# Patient Record
Sex: FEMALE | Race: WHITE | NOT HISPANIC OR LATINO | Employment: FULL TIME | URBAN - NONMETROPOLITAN AREA
[De-identification: names, ages, dates, MRNs, and addresses within clinical notes are randomized per-mention and may not be internally consistent; named-entity substitution may affect disease eponyms.]

---

## 2018-03-23 ENCOUNTER — TRANSCRIBE ORDERS (OUTPATIENT)
Dept: ADMINISTRATIVE | Facility: HOSPITAL | Age: 43
End: 2018-03-23

## 2018-03-23 DIAGNOSIS — Z12.39 SCREENING BREAST EXAMINATION: Primary | ICD-10-CM

## 2018-05-07 ENCOUNTER — APPOINTMENT (OUTPATIENT)
Dept: MAMMOGRAPHY | Facility: HOSPITAL | Age: 43
End: 2018-05-07

## 2018-06-06 ENCOUNTER — APPOINTMENT (OUTPATIENT)
Dept: MAMMOGRAPHY | Facility: HOSPITAL | Age: 43
End: 2018-06-06

## 2018-07-23 ENCOUNTER — HOSPITAL ENCOUNTER (OUTPATIENT)
Dept: MAMMOGRAPHY | Facility: HOSPITAL | Age: 43
Discharge: HOME | End: 2018-07-23
Admitting: OBSTETRICS & GYNECOLOGY

## 2018-07-23 DIAGNOSIS — Z12.39 SCREENING BREAST EXAMINATION: ICD-10-CM

## 2018-07-23 PROCEDURE — 77067 SCR MAMMO BI INCL CAD: CPT

## 2018-07-23 PROCEDURE — 77063 BREAST TOMOSYNTHESIS BI: CPT

## 2019-07-29 ENCOUNTER — TRANSCRIBE ORDERS (OUTPATIENT)
Dept: ADMINISTRATIVE | Facility: HOSPITAL | Age: 44
End: 2019-07-29

## 2019-07-29 DIAGNOSIS — Z12.31 ENCOUNTER FOR SCREENING MAMMOGRAM FOR MALIGNANT NEOPLASM OF BREAST: Primary | ICD-10-CM

## 2019-09-24 ENCOUNTER — APPOINTMENT (OUTPATIENT)
Dept: MAMMOGRAPHY | Facility: HOSPITAL | Age: 44
End: 2019-09-24

## 2019-09-30 ENCOUNTER — HOSPITAL ENCOUNTER (OUTPATIENT)
Dept: MAMMOGRAPHY | Facility: HOSPITAL | Age: 44
Discharge: HOME | End: 2019-09-30
Admitting: OBSTETRICS & GYNECOLOGY

## 2019-09-30 DIAGNOSIS — Z12.31 ENCOUNTER FOR SCREENING MAMMOGRAM FOR MALIGNANT NEOPLASM OF BREAST: ICD-10-CM

## 2019-09-30 PROCEDURE — 77067 SCR MAMMO BI INCL CAD: CPT

## 2019-09-30 PROCEDURE — 77063 BREAST TOMOSYNTHESIS BI: CPT

## 2020-10-16 ENCOUNTER — TRANSCRIBE ORDERS (OUTPATIENT)
Dept: ADMINISTRATIVE | Facility: HOSPITAL | Age: 45
End: 2020-10-16

## 2020-10-16 DIAGNOSIS — Z12.31 VISIT FOR SCREENING MAMMOGRAM: Primary | ICD-10-CM

## 2020-12-08 ENCOUNTER — HOSPITAL ENCOUNTER (OUTPATIENT)
Dept: MAMMOGRAPHY | Facility: HOSPITAL | Age: 45
Discharge: HOME | End: 2020-12-08
Admitting: OBSTETRICS & GYNECOLOGY

## 2020-12-08 DIAGNOSIS — Z12.31 VISIT FOR SCREENING MAMMOGRAM: ICD-10-CM

## 2020-12-08 PROCEDURE — 77067 SCR MAMMO BI INCL CAD: CPT

## 2020-12-08 PROCEDURE — 77063 BREAST TOMOSYNTHESIS BI: CPT

## 2021-06-07 RX ORDER — METOPROLOL SUCCINATE 50 MG/1
50 TABLET, EXTENDED RELEASE ORAL DAILY
COMMUNITY

## 2021-06-07 RX ORDER — CITALOPRAM 20 MG/1
20 TABLET ORAL DAILY
COMMUNITY

## 2021-07-08 ENCOUNTER — OFFICE VISIT (OUTPATIENT)
Dept: BARIATRICS/WEIGHT MGMT | Facility: CLINIC | Age: 46
End: 2021-07-08

## 2021-07-08 ENCOUNTER — DOCUMENTATION (OUTPATIENT)
Dept: BARIATRICS/WEIGHT MGMT | Facility: CLINIC | Age: 46
End: 2021-07-08

## 2021-07-08 VITALS
HEART RATE: 85 BPM | RESPIRATION RATE: 18 BRPM | BODY MASS INDEX: 42.65 KG/M2 | WEIGHT: 256 LBS | SYSTOLIC BLOOD PRESSURE: 122 MMHG | HEIGHT: 65 IN | OXYGEN SATURATION: 98 % | DIASTOLIC BLOOD PRESSURE: 66 MMHG | TEMPERATURE: 97.5 F

## 2021-07-08 DIAGNOSIS — R06.2 WHEEZING: ICD-10-CM

## 2021-07-08 DIAGNOSIS — Z90.49 S/P CHOLE: ICD-10-CM

## 2021-07-08 DIAGNOSIS — Z86.16 HISTORY OF COVID-19: ICD-10-CM

## 2021-07-08 DIAGNOSIS — F41.9 ANXIETY: ICD-10-CM

## 2021-07-08 DIAGNOSIS — R53.83 FATIGUE, UNSPECIFIED TYPE: ICD-10-CM

## 2021-07-08 DIAGNOSIS — E66.01 MORBID OBESITY WITH BMI OF 40.0-44.9, ADULT (HCC): ICD-10-CM

## 2021-07-08 DIAGNOSIS — R12 HEARTBURN: ICD-10-CM

## 2021-07-08 DIAGNOSIS — I10 HYPERTENSION, UNSPECIFIED TYPE: Primary | ICD-10-CM

## 2021-07-08 PROCEDURE — 99204 OFFICE O/P NEW MOD 45 MIN: CPT | Performed by: PHYSICIAN ASSISTANT

## 2021-07-08 RX ORDER — MONTELUKAST SODIUM 10 MG/1
10 TABLET ORAL NIGHTLY
COMMUNITY

## 2021-07-08 NOTE — PROGRESS NOTES
[facility] BARIATRIC SURGERY  [address]  [address]  [facility] [address] [phone]      Patient  Name:  Omero Cadet  :  1975        Chief Complaint:  weight gain; unable to maintain weight loss    History of Present Illness:  Omero Cadet is a 45 y.o. female who presents today for evaluation, education and consultation regarding bariatric and metabolic surgery. The patient is interested in sleeve gastrectomy with NERI Vines.     Omero has been overweight for at least 20+ years, has been 35 pounds or more overweight for at least 20+ years, has been 100 pounds or more overweight for 20+ or more years and started dieting at age 38.      Previous diet attempts include: None; ageless; Ionamin/Adipex.  The most weight Omero lost was 40 pounds on ageless but was only able to maintain that weight loss for several months.  Her maximum lifetime weight is 256 pounds.    As above, patient has been overweight for many years, with numerous failed dietary/weight loss attempts.  She now has obesity related comorbidities and as such has decided to pursue weight loss surgery.    Sister had LSG with Dr. Dueñas. She is pursuing LSG to improve overall health and quality of life.     H/o HTN, anxiety.  Had covid with residual wheezing, now on Singulair. Was never hospitalized. No imaging/ eval.     GI: chronic heartburn treated with tums prn, EGD in remote past, denies prior h pylori. S/p etelvina with gallstones.     All other past medical, surgical, social and family history have been obtained and discussed as pertinent to bariatric surgery as below.     Past Medical History:   Diagnosis Date   • Anxiety    • Fatigue    • Heartburn     tums prn-  EGD remote past, no prior h pylori   • History of COVID-19     not hospitalized, now wtih wheezing and on singulair   • HTN (hypertension)    • Morbid obesity with BMI of 40.0-44.9, adult (CMS/[facility])    • S/P etelvina     wtih  gallstones     Past Surgical History:   Procedure Laterality Date   • HYSTERECTOMY  2015    partial, laparoscopic   • LAPAROSCOPIC CHOLECYSTECTOMY  2005    with stones       Allergies   Allergen Reactions   • Penicillins Rash     Tolerates cephalosporins       Current Outpatient Medications:   •  citalopram (CeleXA) 20 MG tablet, Take 20 mg by mouth Daily., Disp: , Rfl:   •  metoprolol succinate XL (TOPROL-XL) 50 MG 24 hr tablet, Take 50 mg by mouth Daily., Disp: , Rfl:   •  montelukast (SINGULAIR) 10 MG tablet, Take 10 mg by mouth Every Night., Disp: , Rfl:     Social History     Socioeconomic History   • Marital status:      Spouse name: Not on file   • Number of children: Not on file   • Years of education: Not on file   • Highest education level: Not on file   Tobacco Use   • Smoking status: Former Smoker     Packs/day: 1.00     Years: 10.00     Pack years: 10.00     Types: Cigarettes   • Smokeless tobacco: Never Used   Vaping Use   • Vaping Use: Never used   Substance and Sexual Activity   • Alcohol use: Never   • Drug use: Never     Family History   Problem Relation Age of Onset   • Obesity Father    • Hypertension Father    • Cancer Father 65        Melanoma   • Breast cancer Neg Hx        Review of Systems:  Constitutional:  Reports fatigue, weight gain and denies fevers, chills.  HEENT:  denies headache, ear pain or loss of hearing, blurred or double vision, nasal discharge or sore throat.  Cardiovascular:  Reports HTN and denies HLD, CAD, Atrial Fib, hx heart disease, heart murmur, hx MI, chest pain, palpitations, edema, hx DVT.  Respiratory:  Reports wheezing and denies dyspnea on exertion, shortness of breath , cough , sleep apnea, asthma, hx PE.  Gastrointestinal:  Reports heartburn, gallbladder issues and denies dysphagia, nausea, vomiting, abdominal pain, IBS, diarrhea, constipation, melena, blood in stool, liver disease, hx pancreatitis.  Genitourinary:  Reports none and denies history of   frequent UTI, incontinence, hematuria, dysuria, polyuria, polydipsia, renal insufficiency, renal failure.    Musculoskeletal:  Reports none and denies joint pain, fibromyalgia, arthritis and autoimmune disease.  Neurological:  Reports none and denies headaches, migraines, numbness /tingling, dizziness, confusion, seizure, stroke.  Psychiatric:  Reports hx anxiety and denies depressed mood, hx depression, bipolar disorder, suicidal ideation, hx suicide attempt, hx self injury, hx substance abuse, eating disorder.  Endocrine:  Reports none and denies glucose intolerance, diabetes, thyroid disease, gout.  Hematologic:  Reports none and denies bruising, bleeding disorder, hx anemia, hx blood transfusion.  Skin:  Reports none and denies rashes, hx MRSA.      Physical Exam:  Vital Signs:  Weight: 116 kg (256 lb)   Body mass index is 42.6 kg/m².  Temp: 97.5 °F (36.4 °C)   Heart Rate: 85   BP: 122/66     Physical Exam  Vitals reviewed.   Constitutional:       Appearance: She is well-developed. She is obese.   HENT:      Head: Normocephalic.      Comments: mask  Neck:      Thyroid: No thyromegaly.   Cardiovascular:      Rate and Rhythm: Normal rate and regular rhythm.      Heart sounds: Normal heart sounds.   Pulmonary:      Effort: Pulmonary effort is normal. No respiratory distress.      Breath sounds: Normal breath sounds. No wheezing.   Abdominal:      General: Bowel sounds are normal. There is no distension.      Palpations: Abdomen is soft.      Tenderness: There is no abdominal tenderness.      Comments: Lap scars   Musculoskeletal:         General: Normal range of motion.      Cervical back: Normal range of motion and neck supple.   Skin:     General: Skin is warm and dry.   Neurological:      Mental Status: She is alert and oriented to person, place, and time.   Psychiatric:         Mood and Affect: Mood normal.         Behavior: Behavior normal.         Thought Content: Thought content normal.         Judgment:  Judgment normal.         Patient Active Problem List   Diagnosis   • HTN (hypertension)   • Anxiety   • S/P etelvina   • Heartburn   • History of COVID-19   • Morbid obesity with BMI of 40.0-44.9, adult (CMS/[facility])   • Fatigue       Assessment:    Omero Cadet is a 45 y.o. year old female with medically complicated obesity pursuing sleeve gastrectomy.    Weight loss surgery is deemed medically necessary given the following obesity related comorbidities including hypertension and GERD with current Weight: 116 kg (256 lb) and Body mass index is 42.6 kg/m²..    Plan:  The consultation plan and program requirements were reviewed with the patient.  The patient has been advised that a letter of medical support must be obtained from her primary care physician or referring provider. A psychological evaluation will be arranged.  A nutritional evaluation will be performed.  The patient was advised to start a high protein and low carbohydrate diet.  Necessary lifestyle modifications were discussed.  Instructions on how to access HARMONY was given to the patient.  HARMONY is an internet based educational video that explains the surgical procedure chosen and answers basic questions regarding that procedure.     Patient's Body mass index is 42.6 kg/m². indicating that she is morbidly obese (BMI > 40 or > 35 with obesity - related health condition). Obesity-related health conditions include the following: as above. Obesity is worsening. BMI is is above average; BMI management plan is completed. We discussed consulting a Bariatric surgeon..        Preoperative testing will include: CBC, CMP, Lipids, TSH, HgA1C, H.Pylori UBT, EKG, CXR, Pulmonary Function Testing and EGD     The risks and benefits of the upper endoscopy were discussed with the patient in detail and all questions were answered.  Possibility of perforation, bleeding, aspiration, and anesthesia reaction were reviewed.  Patient agrees to proceed.      Additional preop  clearances required prior to surgery: Cardiology.      The patient has been educated on expected postoperative lifestyle changes, including commitment to high protein diet, vitamin regimen, and exercise program.  They are aware that support groups are encouraged for optimal weight loss results. Patient understands that bariatric surgery is not cosmetic surgery but rather a tool to help make a lifelong commitment to lifestyle changes including diet, exercise, behavior modifications, and healthy habits. The procedure was discussed with the patient and all questions were answered. The importance of avoiding ASA/ NSAIDS/ steroids/ tobacco/ hormones/ immunomodulators perioperatively was discussed.         Sarahi Street PA-C

## 2021-07-08 NOTE — PROGRESS NOTES
"Weight Loss Surgery  Presurgical Nutrition Assessment     Omero Cadet  07/08/2021  [id number]  [id number]  1975  female    Surgery desired: Sleeve Gastrectomy    Ht 165.1 cm (65\"); Wt 116 kg  (256 #); BMI 42.6  Past Medical History:   Diagnosis Date   • Anxiety    • Fatigue    • Heartburn     tums prn-  EGD remote past, no prior h pylori   • History of COVID-19     not hospitalized, now wtih wheezing and on singulair   • HTN (hypertension)    • Morbid obesity with BMI of 40.0-44.9, adult (CMS/[facility])    • S/P etelvina     wtih gallstones     Past Surgical History:   Procedure Laterality Date   • HYSTERECTOMY  2015    partial, laparoscopic   • LAPAROSCOPIC CHOLECYSTECTOMY  2005    with stones     Allergies   Allergen Reactions   • Penicillins Rash     Tolerates cephalosporins       Current Outpatient Medications:   •  citalopram (CeleXA) 20 MG tablet, Take 20 mg by mouth Daily., Disp: , Rfl:   •  metoprolol succinate XL (TOPROL-XL) 50 MG 24 hr tablet, Take 50 mg by mouth Daily., Disp: , Rfl:   •  montelukast (SINGULAIR) 10 MG tablet, Take 10 mg by mouth Every Night., Disp: , Rfl:       Nutrition Assessment    Estimated energy needs:  1815 kcal    Estimated calories for weight loss:  1500 kcal    IBW (Pounds):  150 #        Excess body weight (Pounds):  105 #       Nutrition Recall  24 Hour recall: (B) (L) (D) -  Reviewed and discussed with patient. Pt eats at her desk at work @ 9:30: 1 pkt instant oatmeal + 2 sl toast & bottled water.  Lunch @ 12:30 p = 1 c chicken noodle soup /c 8 oz reg Mt Dew OR orders out for CB & fries /c others in office, /c Mt Dew.  Snack @ 4:15 = snack sized Cheezits.  Dinner @ 6 pm = 4 chicken strips + 1 piece of toast.  Diet is low in protein & lacks fruits & veggies. Pt to focus on ingesting adeq protein in 3 reg balanced meals + 2-3 high prot snks qd.       Exercise  never      Education    Provided information packet re:  Sleeve Gastrectomy  1. Reviewed guidelines for higher " protein, limited carbohydrate diet to promote weight loss.  Encouraged patient to incorporate these principles of healthy eating from now until approximately 2 weeks prior to bariatric surgery date, when an even lower carbohydrate “liver-shrinking” regimen will be followed. (Information sheet re pre-op diet given).  Explained that after recovery from surgery this diet will again be followed to ensure further loss and for weight maintenance.    2. Encouraged patient to choose an acceptable protein supplement powder or shake for post-surgery liquid diet.  Provided product guidelines and examples.    3. Explained importance of goal setting to help in changing eating behaviors that are not conducive to weight loss.  Targeted several on a worksheet which also included spaces for patient to work on issues specific to them.  4. Provided follow-up options for support, including contact information for dietitians here, if desired.  Web-based support information and apps for smart phones and computers given.  Noted that monthly support group is offered at this clinic, and that support is associated with successful weight loss.    Recommend that team proceed with surgery and follow per protocol.      Nutrition Goals   Dietary Guidelines per information packet as described above  Protein goal:  grams per day   Carbohydrate goal:  100-140 grams per day  Eliminate soda, sweet tea, etc.     Exercise Goals  Continue current exercise routine   Add 15-30 minutes of activity per day as tolerated      Shaye Bess RD  07/08/2021  12:42 EDT

## 2021-07-12 LAB
ALBUMIN SERPL-MCNC: 4.5 G/DL (ref 3.8–4.8)
ALBUMIN/GLOB SERPL: 1.7 {RATIO} (ref 1.2–2.2)
ALP SERPL-CCNC: 100 IU/L (ref 48–121)
ALT SERPL-CCNC: 23 IU/L (ref 0–32)
AST SERPL-CCNC: 18 IU/L (ref 0–40)
BASOPHILS # BLD AUTO: 0.1 X10E3/UL (ref 0–0.2)
BASOPHILS NFR BLD AUTO: 1 %
BILIRUB SERPL-MCNC: 0.5 MG/DL (ref 0–1.2)
BUN SERPL-MCNC: 13 MG/DL (ref 6–24)
BUN/CREAT SERPL: 15 (ref 9–23)
CALCIUM SERPL-MCNC: 9.1 MG/DL (ref 8.7–10.2)
CHLORIDE SERPL-SCNC: 102 MMOL/L (ref 96–106)
CHOLEST SERPL-MCNC: 169 MG/DL (ref 100–199)
CO2 SERPL-SCNC: 25 MMOL/L (ref 20–29)
CREAT SERPL-MCNC: 0.87 MG/DL (ref 0.57–1)
EOSINOPHIL # BLD AUTO: 0.2 X10E3/UL (ref 0–0.4)
EOSINOPHIL NFR BLD AUTO: 2 %
ERYTHROCYTE [DISTWIDTH] IN BLOOD BY AUTOMATED COUNT: 13.3 % (ref 11.7–15.4)
GLOBULIN SER CALC-MCNC: 2.7 G/DL (ref 1.5–4.5)
GLUCOSE SERPL-MCNC: 83 MG/DL (ref 65–99)
HBA1C MFR BLD: 5.5 % (ref 4.8–5.6)
HCT VFR BLD AUTO: 41.5 % (ref 34–46.6)
HDLC SERPL-MCNC: 53 MG/DL
HGB BLD-MCNC: 13.1 G/DL (ref 11.1–15.9)
IMM GRANULOCYTES # BLD AUTO: 0 X10E3/UL (ref 0–0.1)
IMM GRANULOCYTES NFR BLD AUTO: 0 %
LDLC SERPL CALC-MCNC: 100 MG/DL (ref 0–99)
LYMPHOCYTES # BLD AUTO: 1.3 X10E3/UL (ref 0.7–3.1)
LYMPHOCYTES NFR BLD AUTO: 14 %
MCH RBC QN AUTO: 26.5 PG (ref 26.6–33)
MCHC RBC AUTO-ENTMCNC: 31.6 G/DL (ref 31.5–35.7)
MCV RBC AUTO: 84 FL (ref 79–97)
MONOCYTES # BLD AUTO: 0.4 X10E3/UL (ref 0.1–0.9)
MONOCYTES NFR BLD AUTO: 4 %
NEUTROPHILS # BLD AUTO: 7.7 X10E3/UL (ref 1.4–7)
NEUTROPHILS NFR BLD AUTO: 79 %
PLATELET # BLD AUTO: 294 X10E3/UL (ref 150–450)
POTASSIUM SERPL-SCNC: 4.8 MMOL/L (ref 3.5–5.2)
PROT SERPL-MCNC: 7.2 G/DL (ref 6–8.5)
RBC # BLD AUTO: 4.95 X10E6/UL (ref 3.77–5.28)
SODIUM SERPL-SCNC: 142 MMOL/L (ref 134–144)
TRIGL SERPL-MCNC: 84 MG/DL (ref 0–149)
TSH SERPL DL<=0.005 MIU/L-ACNC: 3.6 UIU/ML (ref 0.45–4.5)
UREA BREATH TEST QL: NEGATIVE
VLDLC SERPL CALC-MCNC: 16 MG/DL (ref 5–40)
WBC # BLD AUTO: 9.7 X10E3/UL (ref 3.4–10.8)

## 2021-07-13 ENCOUNTER — TRANSCRIBE ORDERS (OUTPATIENT)
Dept: LAB | Facility: HOSPITAL | Age: 46
End: 2021-07-13

## 2021-07-13 DIAGNOSIS — Z11.59 ENCOUNTER FOR SCREENING FOR OTHER VIRAL DISEASES: ICD-10-CM

## 2021-07-13 DIAGNOSIS — Z01.818 PRE-OPERATIVE CLEARANCE: Primary | ICD-10-CM

## 2021-07-14 ENCOUNTER — OFFICE VISIT (OUTPATIENT)
Dept: CARDIOLOGY | Facility: CLINIC | Age: 46
End: 2021-07-14

## 2021-07-14 VITALS
DIASTOLIC BLOOD PRESSURE: 80 MMHG | WEIGHT: 256 LBS | HEART RATE: 75 BPM | HEIGHT: 65 IN | SYSTOLIC BLOOD PRESSURE: 118 MMHG | OXYGEN SATURATION: 98 % | BODY MASS INDEX: 42.65 KG/M2

## 2021-07-14 DIAGNOSIS — E66.01 MORBID OBESITY WITH BMI OF 40.0-44.9, ADULT (HCC): ICD-10-CM

## 2021-07-14 DIAGNOSIS — Z01.810 PRE-OPERATIVE CARDIOVASCULAR EXAMINATION: Primary | ICD-10-CM

## 2021-07-14 DIAGNOSIS — I10 ESSENTIAL HYPERTENSION: ICD-10-CM

## 2021-07-14 PROCEDURE — 93000 ELECTROCARDIOGRAM COMPLETE: CPT | Performed by: INTERNAL MEDICINE

## 2021-07-14 PROCEDURE — 99243 OFF/OP CNSLTJ NEW/EST LOW 30: CPT | Performed by: INTERNAL MEDICINE

## 2021-07-14 NOTE — PROGRESS NOTES
Subjective:     Encounter Date:07/14/2021      Patient ID: Omero Cadet is a 45 y.o. female.    Chief Complaint: Preoperative cardiovascular evaluation  HPI  This is a 45-year-old female patient with no prior history of heart disease who presents to cardiac evaluation prior to elective bariatric surgery.  The patient has no prior history of myocardial infarction or coronary revascularization.  She has no history of valvular heart disease, arrhythmia or congestive heart failure.  She has had multiple prior abdominal surgeries with no cardiovascular complications or issues.  She is active with no symptoms given routine daily activities accounting for a cardiac workload of at least 7-8 metabolic equivalents.  She is a non-smoker.  She has a history of hypertension but no personal history of diabetes or dyslipidemia.  The following portions of the patient's history were reviewed and updated as appropriate: allergies, current medications, past family history, past medical history, past social history, past surgical history and problem  Review of Systems   Constitutional: Negative for chills, diaphoresis, fever, malaise/fatigue, weight gain and weight loss.   HENT: Negative for ear discharge, hearing loss, hoarse voice and nosebleeds.    Eyes: Negative for discharge, double vision, pain and photophobia.   Cardiovascular: Negative for chest pain, claudication, cyanosis, dyspnea on exertion, irregular heartbeat, leg swelling, near-syncope, orthopnea, palpitations, paroxysmal nocturnal dyspnea and syncope.   Respiratory: Negative for cough, hemoptysis, shortness of breath, sputum production and wheezing.    Endocrine: Negative for cold intolerance, heat intolerance, polydipsia, polyphagia and polyuria.   Hematologic/Lymphatic: Negative for adenopathy and bleeding problem. Does not bruise/bleed easily.   Skin: Negative for color change, flushing, itching and rash.   Musculoskeletal: Negative for muscle cramps,  "muscle weakness, myalgias and stiffness.   Gastrointestinal: Negative for abdominal pain, diarrhea, hematemesis, hematochezia, nausea and vomiting.   Genitourinary: Negative for dysuria, frequency and nocturia.   Neurological: Negative for focal weakness, loss of balance, numbness, paresthesias and seizures.   Psychiatric/Behavioral: Negative for altered mental status, hallucinations and suicidal ideas.   Allergic/Immunologic: Negative for HIV exposure, hives and persistent infections.           Current Outpatient Medications:   •  citalopram (CeleXA) 20 MG tablet, Take 20 mg by mouth Daily., Disp: , Rfl:   •  metoprolol succinate XL (TOPROL-XL) 50 MG 24 hr tablet, Take 50 mg by mouth Daily., Disp: , Rfl:   •  montelukast (SINGULAIR) 10 MG tablet, Take 10 mg by mouth Every Night., Disp: , Rfl:     Objective:   Vitals and nursing note reviewed.   Constitutional:       Appearance: Healthy appearance. Not in distress.   Neck:      Vascular: No JVR. JVD normal.   Pulmonary:      Effort: Pulmonary effort is normal.      Breath sounds: Normal breath sounds. No wheezing. No rhonchi. No rales.   Chest:      Chest wall: Not tender to palpatation.   Cardiovascular:      PMI at left midclavicular line. Normal rate. Regular rhythm. Normal S1. Normal S2.      Murmurs: There is no murmur.      No gallop. No click. No rub.   Pulses:     Intact distal pulses.   Edema:     Peripheral edema absent.   Abdominal:      General: Bowel sounds are normal.      Palpations: Abdomen is soft.      Tenderness: There is no abdominal tenderness.   Musculoskeletal: Normal range of motion.         General: No tenderness. Skin:     General: Skin is warm and dry.   Neurological:      General: No focal deficit present.      Mental Status: Alert and oriented to person, place and time.       Blood pressure 118/80, pulse 75, height 165.1 cm (65\"), weight 116 kg (256 lb), SpO2 98 %.   Lab Review:     Assessment:       1. Morbid obesity with BMI of " 40.0-44.9, adult (CMS/[facility])  Obesity pattern is central.  This is due to excess calorie intake.    2. Essential hypertension  Acceptable blood pressure control.    3. Pre-operative cardiovascular examination  Acceptable risk of cardiovascular complication from planned bariatric surgery.  The patient has no exertional symptoms with estimated routine daily cardiac workloads of greater than 7 METS.  Cardiopulmonary examination is normal.  Twelve-lead electrocardiogram is normal.      ECG 12 Lead    Date/Time: 7/14/2021 10:45 AM  Performed by: Wayne Nayak MD  Authorized by: Wayne Nayak MD   Previous ECG: no previous ECG available  Rhythm: sinus rhythm  Rate: normal  QRS axis: normal    Clinical impression: normal ECG            Plan:     The patient may proceed with planned bariatric surgery without further delay, testing or indication change.

## 2021-07-16 ENCOUNTER — APPOINTMENT (OUTPATIENT)
Dept: LAB | Facility: HOSPITAL | Age: 46
End: 2021-07-16

## 2021-07-23 ENCOUNTER — APPOINTMENT (OUTPATIENT)
Dept: PREADMISSION TESTING | Facility: HOSPITAL | Age: 46
End: 2021-07-23

## 2021-07-28 ENCOUNTER — APPOINTMENT (OUTPATIENT)
Dept: RESPIRATORY THERAPY | Facility: HOSPITAL | Age: 46
End: 2021-07-28

## 2021-07-28 ENCOUNTER — HOSPITAL ENCOUNTER (OUTPATIENT)
Dept: PULMONOLOGY | Facility: HOSPITAL | Age: 46
Discharge: HOME | End: 2021-07-28
Admitting: PHYSICIAN ASSISTANT

## 2021-07-28 DIAGNOSIS — R06.2 WHEEZING: ICD-10-CM

## 2021-07-28 DIAGNOSIS — Z86.16 HISTORY OF COVID-19: ICD-10-CM

## 2021-07-28 DIAGNOSIS — E66.01 MORBID OBESITY WITH BMI OF 40.0-44.9, ADULT (HCC): ICD-10-CM

## 2021-07-28 PROCEDURE — 94010 BREATHING CAPACITY TEST: CPT | Performed by: INTERNAL MEDICINE

## 2021-07-28 PROCEDURE — 94729 DIFFUSING CAPACITY: CPT | Performed by: INTERNAL MEDICINE

## 2021-07-28 PROCEDURE — 94010 BREATHING CAPACITY TEST: CPT

## 2021-07-28 PROCEDURE — 94729 DIFFUSING CAPACITY: CPT

## 2021-09-01 DIAGNOSIS — Z11.52 ENCOUNTER FOR SCREENING FOR COVID-19: Primary | ICD-10-CM

## 2021-09-08 ENCOUNTER — TELEMEDICINE (OUTPATIENT)
Dept: BARIATRICS/WEIGHT MGMT | Facility: CLINIC | Age: 46
End: 2021-09-08

## 2021-09-08 DIAGNOSIS — K21.9 GASTROESOPHAGEAL REFLUX DISEASE, UNSPECIFIED WHETHER ESOPHAGITIS PRESENT: Primary | ICD-10-CM

## 2021-09-08 PROCEDURE — 99423 OL DIG E/M SVC 21+ MIN: CPT | Performed by: SURGERY

## 2021-09-08 NOTE — PROGRESS NOTES
"[facility] Bariatric Surgery  [address]  [address]  [facility] [address] [phone]        Patient Name: Omero Cadet.  YOB: 1975      Date of Visit: 09/08/2021      Reason for Visit:  GERD    HPI:  Omero Cadet is a 45 y.o. female  Pursuing MBS.    From intake:  \"Sister had LSG with Dr. Dueñas. She is pursuing LSG to improve overall health and quality of life.     H/o HTN, anxiety.  Had covid with residual wheezing, now on Singulair. Was never hospitalized. No imaging/ eval.     GI: chronic heartburn treated with tums prn, EGD in remote past, denies prior h pylori. S/p etelvina with gallstones.\"    09/08/21 Update:    She takes Tums PRN for chronic, episodic heartburn.  Remote EGD, unsure findings.    HP breath test negative.     Past Medical History:   Diagnosis Date   • Anxiety    • Fatigue    • Heartburn     tums prn-  EGD remote past, no prior h pylori   • History of COVID-19     not hospitalized, now wtih wheezing and on singulair   • HTN (hypertension)    • Morbid obesity with BMI of 40.0-44.9, adult (CMS/[facility])    • S/P etelvina     wtih gallstones     Past Surgical History:   Procedure Laterality Date   • HYSTERECTOMY  2015    partial, laparoscopic   • LAPAROSCOPIC CHOLECYSTECTOMY  2005    with stones     No outpatient medications have been marked as taking for the 9/8/21 encounter (Telemedicine) with Maci Olivier MD.     Allergies   Allergen Reactions   • Penicillins Rash     Tolerates cephalosporins       Social History     Socioeconomic History   • Marital status:      Spouse name: Not on file   • Number of children: Not on file   • Years of education: Not on file   • Highest education level: Not on file   Tobacco Use   • Smoking status: Former Smoker     Packs/day: 1.00     Years: 10.00     Pack years: 10.00     Types: Cigarettes   • Smokeless tobacco: Never Used   Vaping Use   • Vaping Use: Never used   Substance and Sexual Activity "   • Alcohol use: Never   • Drug use: Never       There were no vitals filed for this visit.  Weight    There is no height or weight on file to calculate BMI.    Physical Exam  Constitutional:       General: She is not in acute distress.     Appearance: Normal appearance. She is not ill-appearing.   HENT:      Head: Normocephalic and atraumatic.      Nose: Nose normal.   Eyes:      General: No scleral icterus.     Extraocular Movements: Extraocular movements intact.      Conjunctiva/sclera: Conjunctivae normal.      Pupils: Pupils are equal, round, and reactive to light.   Pulmonary:      Effort: Pulmonary effort is normal. No respiratory distress.   Skin:     Coloration: Skin is not pale.   Neurological:      Mental Status: She is alert and oriented to person, place, and time.   Psychiatric:         Mood and Affect: Mood normal.         Behavior: Behavior normal.           Assessment:      ICD-10-CM ICD-9-CM   1. Gastroesophageal reflux disease, unspecified whether esophagitis present  K21.9 530.81       Plan:    EGD with biopsy.  Pt instructed to adhere to NPO after midnight, full liquids for 24 hours before procedure.      The risks and benefits of the upper endoscopy were discussed with the patient in detail and all questions were answered.  Possibility of perforation, bleeding, aspiration, and anesthesia reaction were reviewed.  Patient agrees to proceed.      This visit was conducted as a video visit, in an effort to limit spread of the novel coronavirus during the [id number] pandemic.  The patient gave consent.

## 2021-09-17 ENCOUNTER — APPOINTMENT (OUTPATIENT)
Dept: PREADMISSION TESTING | Facility: HOSPITAL | Age: 46
End: 2021-09-17

## 2021-09-17 ENCOUNTER — LAB (OUTPATIENT)
Dept: LAB | Facility: HOSPITAL | Age: 46
End: 2021-09-17

## 2021-09-17 DIAGNOSIS — Z11.52 ENCOUNTER FOR SCREENING FOR COVID-19: ICD-10-CM

## 2021-09-17 DIAGNOSIS — Z11.59 ENCOUNTER FOR SCREENING FOR OTHER VIRAL DISEASES: ICD-10-CM

## 2021-09-17 DIAGNOSIS — Z01.818 PRE-OPERATIVE CLEARANCE: ICD-10-CM

## 2021-09-17 PROCEDURE — U0004 COV-19 TEST NON-CDC HGH THRU: HCPCS

## 2021-09-17 PROCEDURE — C9803 HOPD COVID-19 SPEC COLLECT: HCPCS

## 2021-09-18 LAB — SARS-COV-2 RNA NOSE QL NAA+PROBE: NOT DETECTED

## 2021-09-20 ENCOUNTER — OUTSIDE FACILITY SERVICE (OUTPATIENT)
Dept: BARIATRICS/WEIGHT MGMT | Facility: CLINIC | Age: 46
End: 2021-09-20

## 2021-09-20 ENCOUNTER — LAB REQUISITION (OUTPATIENT)
Dept: LAB | Facility: HOSPITAL | Age: 46
End: 2021-09-20

## 2021-09-20 DIAGNOSIS — R12 HEARTBURN: ICD-10-CM

## 2021-09-20 PROCEDURE — 43239 EGD BIOPSY SINGLE/MULTIPLE: CPT | Performed by: SURGERY

## 2021-09-20 PROCEDURE — 88305 TISSUE EXAM BY PATHOLOGIST: CPT | Performed by: SURGERY

## 2021-09-21 LAB
CYTO UR: NORMAL
LAB AP CASE REPORT: NORMAL
LAB AP CLINICAL INFORMATION: NORMAL
PATH REPORT.FINAL DX SPEC: NORMAL
PATH REPORT.GROSS SPEC: NORMAL

## 2021-09-27 DIAGNOSIS — R53.83 FATIGUE, UNSPECIFIED TYPE: ICD-10-CM

## 2021-09-27 DIAGNOSIS — R06.00 DYSPNEA, UNSPECIFIED TYPE: Primary | ICD-10-CM

## 2021-10-27 ENCOUNTER — LAB (OUTPATIENT)
Dept: LAB | Facility: HOSPITAL | Age: 46
End: 2021-10-27

## 2021-10-27 ENCOUNTER — HOSPITAL ENCOUNTER (OUTPATIENT)
Dept: GENERAL RADIOLOGY | Facility: HOSPITAL | Age: 46
Discharge: HOME | End: 2021-10-27

## 2021-10-27 DIAGNOSIS — R06.00 DYSPNEA, UNSPECIFIED TYPE: ICD-10-CM

## 2021-10-27 DIAGNOSIS — R53.83 FATIGUE, UNSPECIFIED TYPE: ICD-10-CM

## 2021-10-27 LAB
ALBUMIN SERPL-MCNC: 4.2 G/DL (ref 3.5–5.2)
ALBUMIN/GLOB SERPL: 1.6 G/DL
ALP SERPL-CCNC: 75 U/L (ref 39–117)
ALT SERPL W P-5'-P-CCNC: 21 U/L (ref 1–33)
ANION GAP SERPL CALCULATED.3IONS-SCNC: 7 MMOL/L (ref 5–15)
AST SERPL-CCNC: 20 U/L (ref 1–32)
BILIRUB SERPL-MCNC: 0.3 MG/DL (ref 0–1.2)
BUN SERPL-MCNC: 10 MG/DL (ref 6–20)
BUN/CREAT SERPL: 11.1 (ref 7–25)
CALCIUM SPEC-SCNC: 9 MG/DL (ref 8.6–10.5)
CHLORIDE SERPL-SCNC: 103 MMOL/L (ref 98–107)
CO2 SERPL-SCNC: 27 MMOL/L (ref 22–29)
CREAT SERPL-MCNC: 0.9 MG/DL (ref 0.57–1)
DEPRECATED RDW RBC AUTO: 38.7 FL (ref 37–54)
ERYTHROCYTE [DISTWIDTH] IN BLOOD BY AUTOMATED COUNT: 13.1 % (ref 12.3–15.4)
GFR SERPL CREATININE-BSD FRML MDRD: 68 ML/MIN/1.73
GLOBULIN UR ELPH-MCNC: 2.6 GM/DL
GLUCOSE SERPL-MCNC: 76 MG/DL (ref 65–99)
HCT VFR BLD AUTO: 38.3 % (ref 34–46.6)
HGB BLD-MCNC: 12.6 G/DL (ref 12–15.9)
MCH RBC QN AUTO: 27 PG (ref 26.6–33)
MCHC RBC AUTO-ENTMCNC: 32.9 G/DL (ref 31.5–35.7)
MCV RBC AUTO: 82 FL (ref 79–97)
PLATELET # BLD AUTO: 250 10*3/MM3 (ref 140–450)
PMV BLD AUTO: 11.3 FL (ref 6–12)
POTASSIUM SERPL-SCNC: 4.1 MMOL/L (ref 3.5–5.2)
PROT SERPL-MCNC: 6.8 G/DL (ref 6–8.5)
RBC # BLD AUTO: 4.67 10*6/MM3 (ref 3.77–5.28)
SODIUM SERPL-SCNC: 137 MMOL/L (ref 136–145)
WBC # BLD AUTO: 8.56 10*3/MM3 (ref 3.4–10.8)

## 2021-10-27 PROCEDURE — 80053 COMPREHEN METABOLIC PANEL: CPT

## 2021-10-27 PROCEDURE — 85027 COMPLETE CBC AUTOMATED: CPT

## 2021-10-27 PROCEDURE — 71046 X-RAY EXAM CHEST 2 VIEWS: CPT

## 2021-10-27 PROCEDURE — 36415 COLL VENOUS BLD VENIPUNCTURE: CPT

## 2021-11-02 ENCOUNTER — CONSULT (OUTPATIENT)
Dept: BARIATRICS/WEIGHT MGMT | Facility: CLINIC | Age: 46
End: 2021-11-02

## 2021-11-02 VITALS
BODY MASS INDEX: 42.9 KG/M2 | HEART RATE: 85 BPM | WEIGHT: 257.5 LBS | SYSTOLIC BLOOD PRESSURE: 135 MMHG | TEMPERATURE: 97.3 F | HEIGHT: 65 IN | RESPIRATION RATE: 18 BRPM | DIASTOLIC BLOOD PRESSURE: 84 MMHG | OXYGEN SATURATION: 99 %

## 2021-11-02 DIAGNOSIS — E66.01 MORBID OBESITY WITH BODY MASS INDEX OF 40.0-44.9 IN ADULT (HCC): Primary | ICD-10-CM

## 2021-11-02 PROCEDURE — 99214 OFFICE O/P EST MOD 30 MIN: CPT | Performed by: SURGERY

## 2021-11-02 RX ORDER — ACETAMINOPHEN 500 MG
1000 TABLET ORAL ONCE
Status: CANCELLED | OUTPATIENT
Start: 2021-11-02 | End: 2021-11-02

## 2021-11-02 RX ORDER — SODIUM CHLORIDE, SODIUM LACTATE, POTASSIUM CHLORIDE, CALCIUM CHLORIDE 600; 310; 30; 20 MG/100ML; MG/100ML; MG/100ML; MG/100ML
150 INJECTION, SOLUTION INTRAVENOUS CONTINUOUS
Status: CANCELLED | OUTPATIENT
Start: 2021-11-02

## 2021-11-02 RX ORDER — CHLORHEXIDINE GLUCONATE 0.12 MG/ML
30 RINSE ORAL
Status: CANCELLED | OUTPATIENT
Start: 2021-11-02 | End: 2021-11-02

## 2021-11-02 RX ORDER — SODIUM CHLORIDE 0.9 % (FLUSH) 0.9 %
3 SYRINGE (ML) INJECTION EVERY 12 HOURS SCHEDULED
Status: CANCELLED | OUTPATIENT
Start: 2021-11-02

## 2021-11-02 RX ORDER — SODIUM CHLORIDE 0.9 % (FLUSH) 0.9 %
3-10 SYRINGE (ML) INJECTION AS NEEDED
Status: CANCELLED | OUTPATIENT
Start: 2021-11-02

## 2021-11-02 RX ORDER — SCOLOPAMINE TRANSDERMAL SYSTEM 1 MG/1
1 PATCH, EXTENDED RELEASE TRANSDERMAL ONCE
Status: CANCELLED | OUTPATIENT
Start: 2021-11-02 | End: 2021-11-02

## 2021-11-02 RX ORDER — GABAPENTIN 100 MG/1
600 CAPSULE ORAL ONCE
Status: CANCELLED | OUTPATIENT
Start: 2021-11-02 | End: 2021-11-02

## 2021-11-02 RX ORDER — PANTOPRAZOLE SODIUM 40 MG/10ML
40 INJECTION, POWDER, LYOPHILIZED, FOR SOLUTION INTRAVENOUS ONCE
Status: CANCELLED | OUTPATIENT
Start: 2021-11-02 | End: 2021-11-02

## 2021-12-05 NOTE — PROGRESS NOTES
[facility] BARIATRIC SURGERY  [address]  [address]  [facility] [address]  [phone]      Patient  Name:  Omero Cadet  :  1975      Date of Visit: 21    Chief Complaint:  weight gain; unable to maintain weight loss.   Evaluate for possible metabolic and bariatric surgery    History of Present Illness:  Omero Cadet is a 46 y.o. female who presents today for evaluation, education and consultation regarding metabolic and bariatric surgery (MBS).  Since last seen 2021 she has gained 1-1/2 pounds.The patient returns for final visit prior to metabolic and bariatric surgery specifically the sleeve gastrectomy.  Original intake evaluation Sarahi Street PA-C dated 2020 reviewed.    She noted the patient has history of hypertension and anxiety and had Covid with residual wheezing now on Singulair was never hospitalized and did not require any imaging or evaluation that she has chronic heartburn treated with Tums as needed and had an EGD in the the remote past.      The patient has had issues with morbid obesity for years and only temporary success with non-surgical methods of weight loss.  The patient is seeking LSG to help with the morbid obesity related conditions of anxiety, fatigue, heartburn, hypertension, elevated LDL level.    46-year-old morbidly obese female from [facility].  She is aware of the special circumstances of paying out of pocket for this procedure and BLIS selective complication protectionand still wishes to proceed.  She had Covid in February as above.  I did her sisters and a friend/coworker's sleeve gastrectomies.  She denies personal or family history of bleeding or clotting disorders.      Past Medical History:   Diagnosis Date   • Anxiety    • Elevated LDL cholesterol level    • Fatigue    • Heartburn     tums prn-  EGD remote past, no prior h pylori   • History of COVID-19     not hospitalized, now wtih wheezing and on  singulair   • HTN (hypertension)    • Morbid obesity with BMI of 40.0-44.9, adult (HCC)    •  (normal spontaneous vaginal delivery)     x 2 without complics   • S/P etelvina     wtih gallstones     Past Surgical History:   Procedure Laterality Date   • HYSTERECTOMY      partial, laparoscopic   • LAPAROSCOPIC CHOLECYSTECTOMY      with stones       Allergies   Allergen Reactions   • Penicillins Rash     Tolerates cephalosporins       Current Outpatient Medications:   •  citalopram (CeleXA) 20 MG tablet, Take 20 mg by mouth Daily., Disp: , Rfl:   •  metoprolol succinate XL (TOPROL-XL) 50 MG 24 hr tablet, Take 50 mg by mouth Daily., Disp: , Rfl:   •  montelukast (SINGULAIR) 10 MG tablet, Take 10 mg by mouth Every Night., Disp: , Rfl:     Social History     Socioeconomic History   • Marital status:    Tobacco Use   • Smoking status: Former Smoker     Packs/day: 1.00     Years: 10.00     Pack years: 10.00     Types: Cigarettes   • Smokeless tobacco: Never Used   Vaping Use   • Vaping Use: Never used   Substance and Sexual Activity   • Alcohol use: Never   • Drug use: Never     Family History   Problem Relation Age of Onset   • Obesity Father    • Hypertension Father    • Cancer Father 65        Melanoma   • Breast cancer Neg Hx        Review of Systems   Constitutional: Positive for fatigue and unexpected weight gain. Negative for chills, diaphoresis, fever and unexpected weight loss.   HENT: Negative for congestion and facial swelling.    Eyes: Negative for blurred vision, double vision and discharge.   Respiratory: Negative for chest tightness, shortness of breath and stridor.    Cardiovascular: Negative for chest pain, palpitations and leg swelling.   Gastrointestinal: Negative for blood in stool.   Endocrine: Negative for polydipsia.   Genitourinary: Negative for hematuria.   Skin: Negative for color change.   Allergic/Immunologic: Negative for immunocompromised state.   Neurological: Negative for  confusion.   Psychiatric/Behavioral: Negative for self-injury.       I have reviewed the ROS and confirm that it's accurate today.    Physical Exam:  Vital Signs:  Weight: 117 kg (257 lb 8 oz)   Body mass index is 42.85 kg/m².  Temp: 97.3 °F (36.3 °C)   Heart Rate: 85   BP: 135/84     Physical Exam  Vitals reviewed.   Constitutional:       Appearance: She is well-developed.   HENT:      Head: Normocephalic and atraumatic.      Nose:      Comments: mask  Eyes:      Conjunctiva/sclera: Conjunctivae normal.      Pupils: Pupils are equal, round, and reactive to light.   Neck:      Thyroid: No thyromegaly.      Vascular: No carotid bruit.      Trachea: No tracheal deviation.   Cardiovascular:      Rate and Rhythm: Normal rate and regular rhythm.      Heart sounds: Normal heart sounds.   Pulmonary:      Effort: Pulmonary effort is normal. No respiratory distress.      Breath sounds: Normal breath sounds.   Abdominal:      General: There is no distension.      Palpations: Abdomen is soft.      Tenderness: There is no abdominal tenderness.      Comments: Lap scars   Musculoskeletal:         General: No deformity. Normal range of motion.      Cervical back: Normal range of motion and neck supple.   Skin:     General: Skin is warm and dry.      Findings: No rash.   Neurological:      Mental Status: She is alert and oriented to person, place, and time.      Cranial Nerves: No cranial nerve deficit.      Coordination: Coordination normal.   Psychiatric:         Behavior: Behavior normal.         Thought Content: Thought content normal.         Judgment: Judgment normal.         Patient Active Problem List   Diagnosis   • HTN (hypertension)   • Anxiety   • S/P etelvina   • Heartburn   • History of COVID-19   • Morbid obesity with BMI of 40.0-44.9, adult ([facility])   • Fatigue   • Pre-operative cardiovascular examination   • Morbid obesity with body mass index (BMI) of 40.0 to 44.9 in adult ([facility])       Assessment:    Omero Cadet  is a 46 y.o. year old female with medically complicated obesity.    Metabolic and bariatric surgery is deemed medically necessary given the following obesity related comorbidities including anxiety, fatigue, heartburn, hypertension, elevated LDL level with current Weight: 117 kg (257 lb 8 oz) and Body mass index is 42.85 kg/m²..    Encounter Diagnosis   Name Primary?   • Morbid obesity with body mass index of 40.0-44.9 in adult (HCC) Yes      Patient is aware that surgery is a tool, and that weight loss and improvement in comorbidities is not guaranteed but only seen in the context of appropriate use, follow up and physical activity.    The patient was present for an approximately a 2.5 hour discussion of the purpose of MBS, how MBS is a tool to assist in achieving weight loss goals, the most common complications and how best to avoid them, and the strategies for short and long term weight loss and improvement in comorbidities.  Ample opportunity to discuss questions was available both in group and during the time of individual examination.    I reviewed her Hector report which is negative.  Labs dated 10/27/2021 with an unremarkable CBC of note H&H 12.6 and 38.3 unremarkable CMP.  Chest x-ray dated 10/27/2021 no active process.  EKG dated 7/14/2021 normal sinus rhythm signature illegible psychosocial evaluation dated 7/8/2021 Heracilo Moss, PhD appropriate candidate.  Dietitian evaluation dated 7/8/2021 Shaye marie RD noting she frequently drank regular Mountain Dew and her diet was low in protein and lacked fruit and vegetables.  EGD dated 9/20/2021 showing mild diffuse bile reflux gastritis otherwise unremarkable no hiatal hernia Z-line 36 cm at that time she told me I did her sisters sleeve gastrectomy and that she has history of heartburn for which he takes Tums no dysphagia and told me at that time no prior upper GI evaluation although apparently she had a remote EGD.  Black-and-white images reviewed.   "Pathology of the antrum showed reactive changes negative for H. pylori distal esophageal biopsies showed reactive changes otherwise unremarkable.  Negative H. pylori breath test dated 7/8/2021 other labs from that date normal CMP normal hemoglobin A1c normal lipid panel except for an LDL of 100 normal TSH.  Pulmonary function tests dated 7/31/2021 read by Guero Young MD unremarkable of note FVC and FEV1 1/1/2002 and 107 respectively DLCO 77.  Cardiology clearance dated 7/14/2021 Wayne barth MD at acceptable risk.  Please see scanned records that I have reviewed and signed off on today.  All of this in addition to the patient's unique history and exam has been taken into consideration in determining their appropriate candidacy for MBS.    Complications  of laparoscopic/possible robotic gastric sleeve were discussed. The patient is well aware of the potential complications of surgery that include but not limited to bleeding, infections, deep venous thrombosis, pulmonary embolism, pulmonary complications such as pneumonia, cardiac events, hernias, small bowel obstruction, damage to the spleen or other organs, bowel injury, disfiguring scars, failure to lose weight, need for additional surgery, conversion to an open procedure, and death. Patient is also aware of complications which apply in this particular procedure that can include but are not limited to a \"leak\" at the staple line which in some instances may require conversion to gastric bypass.    The patient is aware if a hiatal hernia is encountered, it likely will be repaired.  R/B/A Rx to hiatal hernia repair were discussed as outlined in our long consent form.  Briefly risks in addition to those for LSG include recurrent hernia, SHARMILA, dysphagia, esophageal injury, pneumothorax, injury to the vagus nerves, injury to the thoracic duct, aorta or vena cava.    I discussed avoiding all tobacco products, nicotine,  and second hand smoke at least 2 weeks " pre-operatively and 6 weeks post-operatively to minimize the risk of sleeve leak.  This included discussing the importance of avoiding even secondhand smoke as the risk of leak is increased.  Examples discussed:  Avoid going in a house or riding in a car where someone has previously smoked in the last 2 weeks and for 6 weeks postoperatively.  Avoid living in a house where someone smokes (even if it's in a separate room/patio/attached garage, etc.).   Avoid congregating with a group of people who are smoking even if it's outside.  It is OK to be around wood burning fires and barbecue.  I explained that I do not know if marijuana has a same effects but my overall recommendation is to avoid it for 2 weeks prior in 6 weeks after surgery.     Discussed the risks, benefits and alternative therapies at great length as outlined in our extensive consent forms, consent videos, and educational teaching process under the direction of the center's .    A copy of the patient's signed informed consent is on file.    R/B/A Rx discussed to postop anticoagulation incl but not limited to bleeding, drug reaction, venothromboembolic events, etc. and the patient declined.        Plan: After evaluation today I think the patient is a reasonable candidate for laparoscopic sleeve gastrectomy and EGD.  Type and screen.  Ancef.  Self-pay issues as above.  Other issues include anxiety, fatigue, heartburn, hypertension, elevated LDL level.    Thank you Sydnie Siddiqi PA-C for the opportunity to evaluate Mrs. Cadet.        Alvin Dueñas MD              Answers for HPI/ROS submitted by the patient on 10/31/2021  Please describe your symptoms.: Bariatric surgery  Have you had these symptoms before?: No  How long have you been having these symptoms?: Greater than 2 weeks  What is the primary reason for your visit?: Other

## 2021-12-15 ENCOUNTER — OFFICE VISIT (OUTPATIENT)
Dept: BARIATRICS/WEIGHT MGMT | Facility: CLINIC | Age: 46
End: 2021-12-15

## 2021-12-15 VITALS
HEIGHT: 65 IN | HEART RATE: 82 BPM | TEMPERATURE: 97.7 F | DIASTOLIC BLOOD PRESSURE: 78 MMHG | OXYGEN SATURATION: 99 % | RESPIRATION RATE: 8 BRPM | SYSTOLIC BLOOD PRESSURE: 124 MMHG | BODY MASS INDEX: 43.57 KG/M2 | WEIGHT: 261.5 LBS

## 2021-12-15 DIAGNOSIS — E66.01 MORBID OBESITY WITH BODY MASS INDEX OF 40.0-44.9 IN ADULT (HCC): Primary | ICD-10-CM

## 2021-12-15 PROCEDURE — 99214 OFFICE O/P EST MOD 30 MIN: CPT | Performed by: PHYSICIAN ASSISTANT

## 2021-12-15 NOTE — PROGRESS NOTES
"[facility] BARIATRIC SURGERY  [address]  [address]  [facility] [address] [phone]      Patient  Name:  Omero Cadet  :  1975      Date of Visit: 12/15/2021      Chief Complaint:  weight gain; unable to maintain weight loss.         History of Present Illness:  Omero Cadet is a 46 y.o. female pursuing MBS w/ Dr. Dueñas.     From Consult 21 w/ Dr. Dueñas:  \"The patient returns for final visit prior to metabolic and bariatric surgery specifically the sleeve gastrectomy.  Original intake evaluation Sarahi Street PA-C dated 2020 reviewed.  She noted the patient has history of hypertension and anxiety and had Covid with residual wheezing now on Singulair was never hospitalized and did not require any imaging or evaluation that she has chronic heartburn treated with Tums as needed and had an EGD in the the remote past.      The patient has had issues with morbid obesity for years and only temporary success with non-surgical methods of weight loss.  The patient is seeking LSG to help with the morbid obesity related conditions of anxiety, fatigue, heartburn, hypertension, elevated LDL level.    46-year-old morbidly obese female from [facility].  She is aware of the special circumstances of paying out of pocket for this procedure and BLIS selective complication protectionand still wishes to proceed.  She had Covid in February as above.  I did her sisters and a friend/coworker's sleeve gastrectomies.  She denies personal or family history of bleeding or clotting disorders.\"    --- Returns today for reevaluation.  Starting preop diet tomorrow.  Avoiding ASA/NSAIDS/steroids/hormones/tobacco.  No new issues/concerns.       Past Medical History:   Diagnosis Date   • Anxiety    • Elevated LDL cholesterol level    • Fatigue    • Heartburn     tums prn-  EGD remote past, EGD Dr. Dueñas 2021   • History of COVID-19     not hospitalized, now wtih wheezing and " on singulair   • HTN (hypertension)    • Morbid obesity with BMI of 40.0-44.9, adult (HCC)    •  (normal spontaneous vaginal delivery)     x 2 without complics   • S/P etelvina     wtih gallstones     Past Surgical History:   Procedure Laterality Date   • HYSTERECTOMY      partial, laparoscopic   • LAPAROSCOPIC CHOLECYSTECTOMY      with stones       Allergies   Allergen Reactions   • Penicillins Rash     Tolerates cephalosporins       Current Outpatient Medications:   •  citalopram (CeleXA) 20 MG tablet, Take 20 mg by mouth Daily., Disp: , Rfl:   •  metoprolol succinate XL (TOPROL-XL) 50 MG 24 hr tablet, Take 50 mg by mouth Daily., Disp: , Rfl:   •  montelukast (SINGULAIR) 10 MG tablet, Take 10 mg by mouth Every Night., Disp: , Rfl:     Social History     Socioeconomic History   • Marital status:    Tobacco Use   • Smoking status: Former Smoker     Packs/day: 1.00     Years: 10.00     Pack years: 10.00     Types: Cigarettes   • Smokeless tobacco: Never Used   Vaping Use   • Vaping Use: Never used   Substance and Sexual Activity   • Alcohol use: Never   • Drug use: Never     Family History   Problem Relation Age of Onset   • Obesity Father    • Hypertension Father    • Cancer Father 65        Melanoma   • Breast cancer Neg Hx        Review of Systems   Constitutional: Positive for fatigue and unexpected weight gain. Negative for chills, diaphoresis, fever and unexpected weight loss.   HENT: Negative for congestion and facial swelling.    Eyes: Negative for blurred vision, double vision and discharge.   Respiratory: Negative for chest tightness, shortness of breath and stridor.    Cardiovascular: Negative for chest pain, palpitations and leg swelling.   Gastrointestinal: Negative for blood in stool.   Endocrine: Negative for polydipsia.   Genitourinary: Negative for hematuria.   Skin: Negative for color change.   Allergic/Immunologic: Negative for immunocompromised state.   Neurological: Negative for  confusion.   Psychiatric/Behavioral: Negative for self-injury.       ROS reviewed today - accurate.     Physical Exam:  Vital Signs:  Weight: 119 kg (261 lb 8 oz)   Body mass index is 43.52 kg/m².  Temp: 97.7 °F (36.5 °C)   Heart Rate: 82   BP: 124/78   From Consult 11/2/21 w/ Dr. Dueñas:   Physical Exam  Vitals reviewed.   Constitutional:       Appearance: She is well-developed.   HENT:      Head: Normocephalic and atraumatic.      Nose:      Comments: mask  Eyes:      Conjunctiva/sclera: Conjunctivae normal.      Pupils: Pupils are equal, round, and reactive to light.   Neck:      Thyroid: No thyromegaly.      Vascular: No carotid bruit.      Trachea: No tracheal deviation.   Cardiovascular:      Rate and Rhythm: Normal rate and regular rhythm.      Heart sounds: Normal heart sounds.   Pulmonary:      Effort: Pulmonary effort is normal. No respiratory distress.      Breath sounds: Normal breath sounds.   Abdominal:      General: There is no distension.      Palpations: Abdomen is soft.      Tenderness: There is no abdominal tenderness.      Comments: Lap scars   Musculoskeletal:         General: No deformity. Normal range of motion.      Cervical back: Normal range of motion and neck supple.   Skin:     General: Skin is warm and dry.      Findings: No rash.   Neurological:      Mental Status: She is alert and oriented to person, place, and time.      Cranial Nerves: No cranial nerve deficit.      Coordination: Coordination normal.   Psychiatric:         Behavior: Behavior normal.         Thought Content: Thought content normal.         Judgment: Judgment normal.         Patient Active Problem List   Diagnosis   • HTN (hypertension)   • Anxiety   • S/P etelvina   • Heartburn   • History of COVID-19   • Morbid obesity with BMI of 40.0-44.9, adult (HCC)   • Fatigue   • Pre-operative cardiovascular examination       Assessment:  Omero Cadet is a 46 y.o. female with medically complicated obesity.    From Consult  "11/2/21 w/ Dr. Dueñas:  \"Metabolic and bariatric surgery is deemed medically necessary given the following obesity related comorbidities including anxiety, fatigue, heartburn, hypertension, elevated LDL level with current Weight: 119 kg (261 lb 8 oz) and Body mass index is 43.52 kg/m²..    Encounter Diagnosis   Name Primary?   • Morbid obesity with body mass index of 40.0-44.9 in adult (HCC) Yes      Patient is aware that surgery is a tool, and that weight loss and improvement in comorbidities is not guaranteed but only seen in the context of appropriate use, follow up and physical activity.    The patient was present for an approximately a 2.5 hour discussion of the purpose of MBS, how MBS is a tool to assist in achieving weight loss goals, the most common complications and how best to avoid them, and the strategies for short and long term weight loss and improvement in comorbidities.  Ample opportunity to discuss questions was available both in group and during the time of individual examination.    I reviewed her Hector report which is negative.  Labs dated 10/27/2021 with an unremarkable CBC of note H&H 12.6 and 38.3 unremarkable CMP.  Chest x-ray dated 10/27/2021 no active process.  EKG dated 7/14/2021 normal sinus rhythm signature illegible psychosocial evaluation dated 7/8/2021 Heraclio Moss, PhD appropriate candidate.  Dietitian evaluation dated 7/8/2021 Shaye marie RD noting she frequently drank regular Mountain Dew and her diet was low in protein and lacked fruit and vegetables.  EGD dated 9/20/2021 showing mild diffuse bile reflux gastritis otherwise unremarkable no hiatal hernia Z-line 36 cm at that time she told me I did her sisters sleeve gastrectomy and that she has history of heartburn for which he takes Tums no dysphagia and told me at that time no prior upper GI evaluation although apparently she had a remote EGD.  Black-and-white images reviewed.  Pathology of the antrum showed reactive changes " "negative for H. pylori distal esophageal biopsies showed reactive changes otherwise unremarkable.  Negative H. pylori breath test dated 7/8/2021 other labs from that date normal CMP normal hemoglobin A1c normal lipid panel except for an LDL of 100 normal TSH.  Pulmonary function tests dated 7/31/2021 read by Guero Young MD unremarkable of note FVC and FEV1 1/1/2002 and 107 respectively DLCO 77.  Cardiology clearance dated 7/14/2021 Wayne barth MD at acceptable risk.  Please see scanned records that I have reviewed and signed off on today.  All of this in addition to the patient's unique history and exam has been taken into consideration in determining their appropriate candidacy for MBS.    Complications  of laparoscopic/possible robotic gastric sleeve were discussed. The patient is well aware of the potential complications of surgery that include but not limited to bleeding, infections, deep venous thrombosis, pulmonary embolism, pulmonary complications such as pneumonia, cardiac events, hernias, small bowel obstruction, damage to the spleen or other organs, bowel injury, disfiguring scars, failure to lose weight, need for additional surgery, conversion to an open procedure, and death. Patient is also aware of complications which apply in this particular procedure that can include but are not limited to a \"leak\" at the staple line which in some instances may require conversion to gastric bypass.    The patient is aware if a hiatal hernia is encountered, it likely will be repaired.  R/B/A Rx to hiatal hernia repair were discussed as outlined in our long consent form.  Briefly risks in addition to those for LSG include recurrent hernia, SHARMILA, dysphagia, esophageal injury, pneumothorax, injury to the vagus nerves, injury to the thoracic duct, aorta or vena cava.    I discussed avoiding all tobacco products, nicotine,  and second hand smoke at least 2 weeks pre-operatively and 6 weeks post-operatively to " "minimize the risk of sleeve leak.  This included discussing the importance of avoiding even secondhand smoke as the risk of leak is increased.  Examples discussed:  Avoid going in a house or riding in a car where someone has previously smoked in the last 2 weeks and for 6 weeks postoperatively.  Avoid living in a house where someone smokes (even if it's in a separate room/patio/attached garage, etc.).   Avoid congregating with a group of people who are smoking even if it's outside.  It is OK to be around wood burning fires and barbecue.  I explained that I do not know if marijuana has a same effects but my overall recommendation is to avoid it for 2 weeks prior in 6 weeks after surgery.     Discussed the risks, benefits and alternative therapies at great length as outlined in our extensive consent forms, consent videos, and educational teaching process under the direction of the center's .    A copy of the patient's signed informed consent is on file.    R/B/A Rx discussed to postop anticoagulation incl but not limited to bleeding, drug reaction, venothromboembolic events, etc. and the patient declined.        Plan: After evaluation today I think the patient is a reasonable candidate for laparoscopic sleeve gastrectomy and EGD.  Type and screen.  Ancef.  Self-pay issues as above.  Other issues include anxiety, fatigue, heartburn, hypertension, elevated LDL level.\"      --- As above, will proceed w/ laparoscopic sleeve gastrectomy + [facility] w[facility] Dr. Dueñas.  All questions answered.  Eager to proceed.   "

## 2021-12-27 ENCOUNTER — PRE-ADMISSION TESTING (OUTPATIENT)
Dept: PREADMISSION TESTING | Facility: HOSPITAL | Age: 46
End: 2021-12-27

## 2021-12-27 DIAGNOSIS — E66.01 MORBID OBESITY WITH BODY MASS INDEX OF 40.0-44.9 IN ADULT (HCC): ICD-10-CM

## 2021-12-27 LAB
ABO GROUP BLD: NORMAL
BLD GP AB SCN SERPL QL: NEGATIVE
DEPRECATED RDW RBC AUTO: 41.4 FL (ref 37–54)
ERYTHROCYTE [DISTWIDTH] IN BLOOD BY AUTOMATED COUNT: 14.1 % (ref 12.3–15.4)
HBA1C MFR BLD: 5.4 % (ref 4.8–5.6)
HCT VFR BLD AUTO: 37.7 % (ref 34–46.6)
HGB BLD-MCNC: 12.6 G/DL (ref 12–15.9)
MCH RBC QN AUTO: 26.8 PG (ref 26.6–33)
MCHC RBC AUTO-ENTMCNC: 33.4 G/DL (ref 31.5–35.7)
MCV RBC AUTO: 80.2 FL (ref 79–97)
PLATELET # BLD AUTO: 311 10*3/MM3 (ref 140–450)
PMV BLD AUTO: 10.4 FL (ref 6–12)
POTASSIUM SERPL-SCNC: 4.1 MMOL/L (ref 3.5–5.2)
RBC # BLD AUTO: 4.7 10*6/MM3 (ref 3.77–5.28)
RH BLD: POSITIVE
T&S EXPIRATION DATE: NORMAL
WBC NRBC COR # BLD: 9.89 10*3/MM3 (ref 3.4–10.8)

## 2021-12-27 PROCEDURE — U0004 COV-19 TEST NON-CDC HGH THRU: HCPCS | Performed by: SURGERY

## 2021-12-27 PROCEDURE — 86850 RBC ANTIBODY SCREEN: CPT

## 2021-12-27 PROCEDURE — 85027 COMPLETE CBC AUTOMATED: CPT

## 2021-12-27 PROCEDURE — 36415 COLL VENOUS BLD VENIPUNCTURE: CPT

## 2021-12-27 PROCEDURE — 84132 ASSAY OF SERUM POTASSIUM: CPT

## 2021-12-27 PROCEDURE — 86900 BLOOD TYPING SEROLOGIC ABO: CPT

## 2021-12-27 PROCEDURE — 86901 BLOOD TYPING SEROLOGIC RH(D): CPT

## 2021-12-27 PROCEDURE — 83036 HEMOGLOBIN GLYCOSYLATED A1C: CPT

## 2021-12-27 RX ORDER — LEVOFLOXACIN 500 MG/1
500 TABLET, FILM COATED ORAL DAILY
COMMUNITY
End: 2021-12-30 | Stop reason: HOSPADM

## 2021-12-27 NOTE — PAT
Patient to apply Chlorhexadine wipes  to surgical area (as instructed) the night before procedure and the AM of procedure. Wipes provided.    Patient instructed to drink 20 ounces (or until full) of Gatorade and it needs to be completed 1 hour (for Main OR patients) or 2 hours (scheduled  section patients) before given arrival time for procedure (NO RED Gatorade)    Patient verbalized understanding.    Blood bank bracelet applied to patient during Pre Admission Testing visit.  Patient instructed not to remove from arm until after procedure and they are discharged from the hospital.  Explained to patient that they may shower and get the bracelet wet, but not to immerse under water for longer periods (bathing, swimming, hand dishwashing, etc).  Patient verbalized understanding.    CXR 10/27/2021  EKG2021    NO MRSA HX PER PT.     PATIENT CURRENTLY TAKING LEVAQUIN 500MG 1QDX10 DAYS. STARTED 2021 FOR SINUS INFECTION. PAT NURSE ATTEMPTED TO CONTACT OFFICE STAFF TO NOTIFY. NO ANSWER. PATIENT STATES SHE WILL SEND AMOR NG A MESSAGE ON [employer] AND NOTIFY HER THAT SHE IS TAKING. PT WAS PREVIOUSLY TAKING A ZPACK BUT IT WAS NOT WORKING, SO MD CHANGED TO LEVAQUIN. PER PT SINUS SYMPTOMS HAVE RESOLVED.     Patient viewed general PAT education video as instructed in their preoperative information received from their surgeon.  Patient stated the general PAT education video was viewed in its entirety and survey completed.  Copies of [facility] general education handouts (Incentive Spirometry, Meds to Beds Program, Patient Belongings, Pre-op skin preparation instructions, Blood Glucose testing, Visitor policy, Surgery FAQ, Code H) distributed to patient if not printed. Education related to the PAT pass and skin preparation for surgery (if applicable) completed in [facility] as a reinforcement to PAT education video. Patient instructed to return PAT pass provided today as well as completed skin preparation sheet (if  applicable) on the day of procedure.     Additionally if patient had not viewed video yet but intended to view it at home or in our waiting area, then referred them to the handout with QR code/link provided during PAT visit.  Instructed patient to complete survey after viewing the video in its entirety.  Encouraged patient/family to read PAT general education handouts thoroughly and notify PAT staff with any questions or concerns. Patient verbalized understanding of all information and priority content.

## 2021-12-29 ENCOUNTER — HOSPITAL ENCOUNTER (INPATIENT)
Facility: HOSPITAL | Age: 46
LOS: 1 days | Discharge: HOME | End: 2021-12-30
Attending: SURGERY | Admitting: SURGERY

## 2021-12-29 ENCOUNTER — ANESTHESIA EVENT (OUTPATIENT)
Dept: PERIOP | Facility: HOSPITAL | Age: 46
End: 2021-12-29

## 2021-12-29 ENCOUNTER — ANESTHESIA EVENT CONVERTED (OUTPATIENT)
Dept: ANESTHESIOLOGY | Facility: HOSPITAL | Age: 46
End: 2021-12-29

## 2021-12-29 ENCOUNTER — ANESTHESIA (OUTPATIENT)
Dept: PERIOP | Facility: HOSPITAL | Age: 46
End: 2021-12-29

## 2021-12-29 DIAGNOSIS — E66.01 MORBID OBESITY WITH BODY MASS INDEX OF 40.0-44.9 IN ADULT: ICD-10-CM

## 2021-12-29 PROCEDURE — 25010000002 DEXAMETHASONE PER 1 MG: Performed by: NURSE ANESTHETIST, CERTIFIED REGISTERED

## 2021-12-29 PROCEDURE — C1889 IMPLANT/INSERT DEVICE, NOC: HCPCS | Performed by: SURGERY

## 2021-12-29 PROCEDURE — 0DJ08ZZ INSPECTION OF UPPER INTESTINAL TRACT, VIA NATURAL OR ARTIFICIAL OPENING ENDOSCOPIC: ICD-10-PCS | Performed by: SURGERY

## 2021-12-29 PROCEDURE — 25010000002 HYDROMORPHONE 1 MG/ML SOLUTION

## 2021-12-29 PROCEDURE — 25010000002 PROPOFOL 10 MG/ML EMULSION: Performed by: NURSE ANESTHETIST, CERTIFIED REGISTERED

## 2021-12-29 PROCEDURE — 25010000002 FENTANYL CITRATE (PF) 50 MCG/ML SOLUTION: Performed by: NURSE ANESTHETIST, CERTIFIED REGISTERED

## 2021-12-29 PROCEDURE — 0 CEFAZOLIN IN DEXTROSE 2-4 GM/100ML-% SOLUTION: Performed by: SURGERY

## 2021-12-29 PROCEDURE — 25010000002 ONDANSETRON PER 1 MG: Performed by: SURGERY

## 2021-12-29 PROCEDURE — 25010000002 HYDRALAZINE PER 20 MG: Performed by: SURGERY

## 2021-12-29 PROCEDURE — 43775 LAP SLEEVE GASTRECTOMY: CPT | Performed by: SURGERY

## 2021-12-29 PROCEDURE — 88307 TISSUE EXAM BY PATHOLOGIST: CPT | Performed by: SURGERY

## 2021-12-29 PROCEDURE — 0DB64Z3 EXCISION OF STOMACH, PERCUTANEOUS ENDOSCOPIC APPROACH, VERTICAL: ICD-10-PCS | Performed by: SURGERY

## 2021-12-29 PROCEDURE — 25010000002 DEXAMETHASONE SODIUM PHOSPHATE 10 MG/ML SOLUTION: Performed by: ANESTHESIOLOGY

## 2021-12-29 PROCEDURE — 25010000002 DROPERIDOL PER 5 MG

## 2021-12-29 PROCEDURE — 25010000002 METOCLOPRAMIDE PER 10 MG: Performed by: SURGERY

## 2021-12-29 DEVICE — SEALANT WND FIBRIN TISSEEL PREFIL/SYR/PRIMAFZ 4ML: Type: IMPLANTABLE DEVICE | Site: ABDOMEN | Status: FUNCTIONAL

## 2021-12-29 DEVICE — ABSORBABLE WOUND CLOSURE DEVICE
Type: IMPLANTABLE DEVICE | Site: ABDOMEN | Status: FUNCTIONAL
Brand: V-LOC 180

## 2021-12-29 DEVICE — IMPLANTABLE DEVICE
Type: IMPLANTABLE DEVICE | Site: ABDOMEN | Status: FUNCTIONAL
Brand: TITAN SGS STANDARD GASTRIC STAPLER

## 2021-12-29 RX ORDER — FENTANYL CITRATE 50 UG/ML
50 INJECTION, SOLUTION INTRAMUSCULAR; INTRAVENOUS
Status: DISCONTINUED | OUTPATIENT
Start: 2021-12-29 | End: 2021-12-29 | Stop reason: HOSPADM

## 2021-12-29 RX ORDER — SODIUM CHLORIDE, SODIUM LACTATE, POTASSIUM CHLORIDE, CALCIUM CHLORIDE 600; 310; 30; 20 MG/100ML; MG/100ML; MG/100ML; MG/100ML
9 INJECTION, SOLUTION INTRAVENOUS CONTINUOUS
Status: DISCONTINUED | OUTPATIENT
Start: 2021-12-29 | End: 2021-12-29 | Stop reason: HOSPADM

## 2021-12-29 RX ORDER — SIMETHICONE 80 MG
80 TABLET,CHEWABLE ORAL 4 TIMES DAILY PRN
Status: DISCONTINUED | OUTPATIENT
Start: 2021-12-29 | End: 2021-12-30 | Stop reason: HOSPADM

## 2021-12-29 RX ORDER — SODIUM CHLORIDE 0.9 % (FLUSH) 0.9 %
3 SYRINGE (ML) INJECTION EVERY 12 HOURS SCHEDULED
Status: DISCONTINUED | OUTPATIENT
Start: 2021-12-29 | End: 2021-12-29 | Stop reason: HOSPADM

## 2021-12-29 RX ORDER — NALOXONE HCL 0.4 MG/ML
0.1 VIAL (ML) INJECTION
Status: DISCONTINUED | OUTPATIENT
Start: 2021-12-29 | End: 2021-12-30 | Stop reason: HOSPADM

## 2021-12-29 RX ORDER — SODIUM CHLORIDE, SODIUM LACTATE, POTASSIUM CHLORIDE, CALCIUM CHLORIDE 600; 310; 30; 20 MG/100ML; MG/100ML; MG/100ML; MG/100ML
150 INJECTION, SOLUTION INTRAVENOUS CONTINUOUS
Status: DISCONTINUED | OUTPATIENT
Start: 2021-12-29 | End: 2021-12-29 | Stop reason: HOSPADM

## 2021-12-29 RX ORDER — SODIUM CHLORIDE 9 MG/ML
INJECTION, SOLUTION INTRAVENOUS AS NEEDED
Status: DISCONTINUED | OUTPATIENT
Start: 2021-12-29 | End: 2021-12-29 | Stop reason: HOSPADM

## 2021-12-29 RX ORDER — PROMETHAZINE HYDROCHLORIDE 25 MG/1
25 TABLET ORAL ONCE AS NEEDED
Status: DISCONTINUED | OUTPATIENT
Start: 2021-12-29 | End: 2021-12-29 | Stop reason: HOSPADM

## 2021-12-29 RX ORDER — DROPERIDOL 2.5 MG/ML
0.62 INJECTION, SOLUTION INTRAMUSCULAR; INTRAVENOUS AS NEEDED
Status: DISCONTINUED | OUTPATIENT
Start: 2021-12-29 | End: 2021-12-29 | Stop reason: HOSPADM

## 2021-12-29 RX ORDER — ONDANSETRON 4 MG/1
4 TABLET, FILM COATED ORAL EVERY 6 HOURS PRN
Status: DISCONTINUED | OUTPATIENT
Start: 2022-01-02 | End: 2021-12-30 | Stop reason: HOSPADM

## 2021-12-29 RX ORDER — SODIUM CHLORIDE, SODIUM LACTATE, POTASSIUM CHLORIDE, CALCIUM CHLORIDE 600; 310; 30; 20 MG/100ML; MG/100ML; MG/100ML; MG/100ML
150 INJECTION, SOLUTION INTRAVENOUS CONTINUOUS
Status: DISCONTINUED | OUTPATIENT
Start: 2021-12-29 | End: 2021-12-30 | Stop reason: HOSPADM

## 2021-12-29 RX ORDER — FENTANYL CITRATE 50 UG/ML
INJECTION, SOLUTION INTRAMUSCULAR; INTRAVENOUS AS NEEDED
Status: DISCONTINUED | OUTPATIENT
Start: 2021-12-29 | End: 2021-12-29 | Stop reason: SURG

## 2021-12-29 RX ORDER — CHLORHEXIDINE GLUCONATE 0.12 MG/ML
30 RINSE ORAL
Status: COMPLETED | OUTPATIENT
Start: 2021-12-29 | End: 2021-12-29

## 2021-12-29 RX ORDER — CEFAZOLIN SODIUM 2 G/100ML
2 INJECTION, SOLUTION INTRAVENOUS EVERY 8 HOURS
Status: DISCONTINUED | OUTPATIENT
Start: 2021-12-29 | End: 2021-12-29

## 2021-12-29 RX ORDER — MAGNESIUM HYDROXIDE 1200 MG/15ML
LIQUID ORAL AS NEEDED
Status: DISCONTINUED | OUTPATIENT
Start: 2021-12-29 | End: 2021-12-29 | Stop reason: HOSPADM

## 2021-12-29 RX ORDER — LABETALOL HYDROCHLORIDE 5 MG/ML
5 INJECTION, SOLUTION INTRAVENOUS
Status: DISCONTINUED | OUTPATIENT
Start: 2021-12-29 | End: 2021-12-29 | Stop reason: HOSPADM

## 2021-12-29 RX ORDER — NALOXONE HCL 0.4 MG/ML
0.4 VIAL (ML) INJECTION AS NEEDED
Status: DISCONTINUED | OUTPATIENT
Start: 2021-12-29 | End: 2021-12-29 | Stop reason: HOSPADM

## 2021-12-29 RX ORDER — FAMOTIDINE 10 MG/ML
20 INJECTION, SOLUTION INTRAVENOUS ONCE
Status: CANCELLED | OUTPATIENT
Start: 2021-12-29 | End: 2021-12-29

## 2021-12-29 RX ORDER — PANTOPRAZOLE SODIUM 40 MG/10ML
40 INJECTION, POWDER, LYOPHILIZED, FOR SOLUTION INTRAVENOUS
Status: DISCONTINUED | OUTPATIENT
Start: 2021-12-30 | End: 2021-12-30 | Stop reason: HOSPADM

## 2021-12-29 RX ORDER — DIPHENHYDRAMINE HYDROCHLORIDE 50 MG/ML
25 INJECTION INTRAMUSCULAR; INTRAVENOUS EVERY 4 HOURS PRN
Status: DISCONTINUED | OUTPATIENT
Start: 2021-12-29 | End: 2021-12-30 | Stop reason: HOSPADM

## 2021-12-29 RX ORDER — MIDAZOLAM HYDROCHLORIDE 1 MG/ML
1 INJECTION INTRAMUSCULAR; INTRAVENOUS
Status: DISCONTINUED | OUTPATIENT
Start: 2021-12-29 | End: 2021-12-29 | Stop reason: HOSPADM

## 2021-12-29 RX ORDER — CEFAZOLIN SODIUM 2 G/100ML
2 INJECTION, SOLUTION INTRAVENOUS ONCE
Status: DISCONTINUED | OUTPATIENT
Start: 2021-12-29 | End: 2021-12-29 | Stop reason: HOSPADM

## 2021-12-29 RX ORDER — PROCHLORPERAZINE MALEATE 10 MG
10 TABLET ORAL EVERY 6 HOURS PRN
Status: DISCONTINUED | OUTPATIENT
Start: 2021-12-29 | End: 2021-12-30 | Stop reason: HOSPADM

## 2021-12-29 RX ORDER — MORPHINE SULFATE 4 MG/ML
4 INJECTION, SOLUTION INTRAMUSCULAR; INTRAVENOUS
Status: DISCONTINUED | OUTPATIENT
Start: 2021-12-29 | End: 2021-12-30 | Stop reason: HOSPADM

## 2021-12-29 RX ORDER — GABAPENTIN 100 MG/1
100 CAPSULE ORAL 3 TIMES DAILY
Status: DISCONTINUED | OUTPATIENT
Start: 2021-12-29 | End: 2021-12-30 | Stop reason: HOSPADM

## 2021-12-29 RX ORDER — PANTOPRAZOLE SODIUM 40 MG/10ML
40 INJECTION, POWDER, LYOPHILIZED, FOR SOLUTION INTRAVENOUS ONCE
Status: COMPLETED | OUTPATIENT
Start: 2021-12-29 | End: 2021-12-29

## 2021-12-29 RX ORDER — CITALOPRAM 20 MG/1
20 TABLET ORAL DAILY
Status: DISCONTINUED | OUTPATIENT
Start: 2021-12-29 | End: 2021-12-30 | Stop reason: HOSPADM

## 2021-12-29 RX ORDER — PROMETHAZINE HYDROCHLORIDE 25 MG/1
25 SUPPOSITORY RECTAL ONCE AS NEEDED
Status: DISCONTINUED | OUTPATIENT
Start: 2021-12-29 | End: 2021-12-29 | Stop reason: HOSPADM

## 2021-12-29 RX ORDER — METOPROLOL SUCCINATE 50 MG/1
50 TABLET, EXTENDED RELEASE ORAL DAILY
Status: DISCONTINUED | OUTPATIENT
Start: 2021-12-29 | End: 2021-12-30 | Stop reason: HOSPADM

## 2021-12-29 RX ORDER — METOCLOPRAMIDE HYDROCHLORIDE 5 MG/ML
10 INJECTION INTRAMUSCULAR; INTRAVENOUS EVERY 6 HOURS PRN
Status: DISCONTINUED | OUTPATIENT
Start: 2021-12-29 | End: 2021-12-30 | Stop reason: HOSPADM

## 2021-12-29 RX ORDER — SODIUM CHLORIDE AND POTASSIUM CHLORIDE 150; 450 MG/100ML; MG/100ML
125 INJECTION, SOLUTION INTRAVENOUS CONTINUOUS
Status: DISCONTINUED | OUTPATIENT
Start: 2021-12-30 | End: 2021-12-30 | Stop reason: HOSPADM

## 2021-12-29 RX ORDER — BUPIVACAINE HYDROCHLORIDE 2.5 MG/ML
INJECTION, SOLUTION EPIDURAL; INFILTRATION; INTRACAUDAL
Status: COMPLETED | OUTPATIENT
Start: 2021-12-29 | End: 2021-12-29

## 2021-12-29 RX ORDER — HYDROMORPHONE HYDROCHLORIDE 1 MG/ML
0.5 INJECTION, SOLUTION INTRAMUSCULAR; INTRAVENOUS; SUBCUTANEOUS
Status: DISCONTINUED | OUTPATIENT
Start: 2021-12-29 | End: 2021-12-29 | Stop reason: HOSPADM

## 2021-12-29 RX ORDER — ONDANSETRON 2 MG/ML
4 INJECTION INTRAMUSCULAR; INTRAVENOUS ONCE AS NEEDED
Status: DISCONTINUED | OUTPATIENT
Start: 2021-12-29 | End: 2021-12-29 | Stop reason: HOSPADM

## 2021-12-29 RX ORDER — ACETAMINOPHEN 500 MG
1000 TABLET ORAL ONCE
Status: COMPLETED | OUTPATIENT
Start: 2021-12-29 | End: 2021-12-29

## 2021-12-29 RX ORDER — GABAPENTIN 300 MG/1
600 CAPSULE ORAL ONCE
Status: COMPLETED | OUTPATIENT
Start: 2021-12-29 | End: 2021-12-29

## 2021-12-29 RX ORDER — ALPRAZOLAM 0.25 MG/1
0.25 TABLET ORAL ONCE AS NEEDED
Status: DISCONTINUED | OUTPATIENT
Start: 2021-12-29 | End: 2021-12-30 | Stop reason: HOSPADM

## 2021-12-29 RX ORDER — SODIUM CHLORIDE 0.9 % (FLUSH) 0.9 %
10 SYRINGE (ML) INJECTION AS NEEDED
Status: DISCONTINUED | OUTPATIENT
Start: 2021-12-29 | End: 2021-12-29 | Stop reason: HOSPADM

## 2021-12-29 RX ORDER — LABETALOL HYDROCHLORIDE 5 MG/ML
INJECTION, SOLUTION INTRAVENOUS
Status: COMPLETED
Start: 2021-12-29 | End: 2021-12-29

## 2021-12-29 RX ORDER — SODIUM CHLORIDE 0.9 % (FLUSH) 0.9 %
3-10 SYRINGE (ML) INJECTION AS NEEDED
Status: DISCONTINUED | OUTPATIENT
Start: 2021-12-29 | End: 2021-12-29 | Stop reason: HOSPADM

## 2021-12-29 RX ORDER — DROPERIDOL 2.5 MG/ML
INJECTION, SOLUTION INTRAMUSCULAR; INTRAVENOUS
Status: COMPLETED
Start: 2021-12-29 | End: 2021-12-29

## 2021-12-29 RX ORDER — LIDOCAINE HYDROCHLORIDE 10 MG/ML
0.5 INJECTION, SOLUTION EPIDURAL; INFILTRATION; INTRACAUDAL; PERINEURAL ONCE AS NEEDED
Status: COMPLETED | OUTPATIENT
Start: 2021-12-29 | End: 2021-12-29

## 2021-12-29 RX ORDER — HYDRALAZINE HYDROCHLORIDE 20 MG/ML
10 INJECTION INTRAMUSCULAR; INTRAVENOUS
Status: DISCONTINUED | OUTPATIENT
Start: 2021-12-29 | End: 2021-12-30 | Stop reason: HOSPADM

## 2021-12-29 RX ORDER — CYANOCOBALAMIN 1000 UG/ML
1000 INJECTION, SOLUTION INTRAMUSCULAR; SUBCUTANEOUS ONCE
Status: COMPLETED | OUTPATIENT
Start: 2021-12-30 | End: 2021-12-30

## 2021-12-29 RX ORDER — PROPOFOL 10 MG/ML
VIAL (ML) INTRAVENOUS AS NEEDED
Status: DISCONTINUED | OUTPATIENT
Start: 2021-12-29 | End: 2021-12-29 | Stop reason: SURG

## 2021-12-29 RX ORDER — GABAPENTIN 250 MG/5ML
100 SOLUTION ORAL 3 TIMES DAILY
Status: DISCONTINUED | OUTPATIENT
Start: 2021-12-29 | End: 2021-12-30 | Stop reason: HOSPADM

## 2021-12-29 RX ORDER — MEPERIDINE HYDROCHLORIDE 25 MG/ML
12.5 INJECTION INTRAMUSCULAR; INTRAVENOUS; SUBCUTANEOUS
Status: DISCONTINUED | OUTPATIENT
Start: 2021-12-29 | End: 2021-12-29 | Stop reason: HOSPADM

## 2021-12-29 RX ORDER — SCOLOPAMINE TRANSDERMAL SYSTEM 1 MG/1
1 PATCH, EXTENDED RELEASE TRANSDERMAL ONCE
Status: DISCONTINUED | OUTPATIENT
Start: 2021-12-29 | End: 2021-12-29

## 2021-12-29 RX ORDER — HYDRALAZINE HYDROCHLORIDE 20 MG/ML
5 INJECTION INTRAMUSCULAR; INTRAVENOUS
Status: DISCONTINUED | OUTPATIENT
Start: 2021-12-29 | End: 2021-12-29 | Stop reason: HOSPADM

## 2021-12-29 RX ORDER — CEFAZOLIN SODIUM 2 G/100ML
2 INJECTION, SOLUTION INTRAVENOUS EVERY 8 HOURS
Status: COMPLETED | OUTPATIENT
Start: 2021-12-29 | End: 2021-12-30

## 2021-12-29 RX ORDER — IPRATROPIUM BROMIDE AND ALBUTEROL SULFATE 2.5; .5 MG/3ML; MG/3ML
3 SOLUTION RESPIRATORY (INHALATION) ONCE AS NEEDED
Status: DISCONTINUED | OUTPATIENT
Start: 2021-12-29 | End: 2021-12-29 | Stop reason: HOSPADM

## 2021-12-29 RX ORDER — ALBUTEROL SULFATE 2.5 MG/3ML
2.5 SOLUTION RESPIRATORY (INHALATION) EVERY 4 HOURS PRN
Status: DISCONTINUED | OUTPATIENT
Start: 2021-12-29 | End: 2021-12-30 | Stop reason: HOSPADM

## 2021-12-29 RX ORDER — ROCURONIUM BROMIDE 10 MG/ML
INJECTION, SOLUTION INTRAVENOUS AS NEEDED
Status: DISCONTINUED | OUTPATIENT
Start: 2021-12-29 | End: 2021-12-29 | Stop reason: SURG

## 2021-12-29 RX ORDER — LORAZEPAM 1 MG/1
1 TABLET ORAL EVERY 12 HOURS PRN
Status: DISCONTINUED | OUTPATIENT
Start: 2021-12-29 | End: 2021-12-30 | Stop reason: HOSPADM

## 2021-12-29 RX ORDER — HYDROCODONE BITARTRATE AND ACETAMINOPHEN 5; 325 MG/1; MG/1
1 TABLET ORAL ONCE AS NEEDED
Status: DISCONTINUED | OUTPATIENT
Start: 2021-12-29 | End: 2021-12-29 | Stop reason: HOSPADM

## 2021-12-29 RX ORDER — DEXAMETHASONE SODIUM PHOSPHATE 4 MG/ML
INJECTION, SOLUTION INTRA-ARTICULAR; INTRALESIONAL; INTRAMUSCULAR; INTRAVENOUS; SOFT TISSUE AS NEEDED
Status: DISCONTINUED | OUTPATIENT
Start: 2021-12-29 | End: 2021-12-29 | Stop reason: SURG

## 2021-12-29 RX ORDER — OXYCODONE HYDROCHLORIDE 5 MG/1
5 TABLET ORAL EVERY 6 HOURS PRN
Status: DISCONTINUED | OUTPATIENT
Start: 2021-12-29 | End: 2021-12-30 | Stop reason: HOSPADM

## 2021-12-29 RX ORDER — MONTELUKAST SODIUM 10 MG/1
10 TABLET ORAL NIGHTLY
Status: DISCONTINUED | OUTPATIENT
Start: 2021-12-29 | End: 2021-12-30 | Stop reason: HOSPADM

## 2021-12-29 RX ORDER — HYDROMORPHONE HYDROCHLORIDE 2 MG/1
2 TABLET ORAL EVERY 4 HOURS PRN
Status: DISCONTINUED | OUTPATIENT
Start: 2021-12-29 | End: 2021-12-30 | Stop reason: HOSPADM

## 2021-12-29 RX ORDER — PROMETHAZINE HYDROCHLORIDE 12.5 MG/1
12.5 TABLET ORAL EVERY 6 HOURS PRN
Status: DISCONTINUED | OUTPATIENT
Start: 2021-12-29 | End: 2021-12-30 | Stop reason: HOSPADM

## 2021-12-29 RX ORDER — NALOXONE HCL 0.4 MG/ML
0.4 VIAL (ML) INJECTION
Status: DISCONTINUED | OUTPATIENT
Start: 2021-12-29 | End: 2021-12-30 | Stop reason: HOSPADM

## 2021-12-29 RX ORDER — ACETAMINOPHEN 500 MG
1000 TABLET ORAL EVERY 8 HOURS SCHEDULED
Status: DISCONTINUED | OUTPATIENT
Start: 2021-12-29 | End: 2021-12-30 | Stop reason: HOSPADM

## 2021-12-29 RX ORDER — ONDANSETRON 4 MG/1
4 TABLET, FILM COATED ORAL EVERY 4 HOURS PRN
Status: DISCONTINUED | OUTPATIENT
Start: 2021-12-29 | End: 2021-12-29 | Stop reason: SDUPTHER

## 2021-12-29 RX ORDER — DEXAMETHASONE SODIUM PHOSPHATE 10 MG/ML
INJECTION, SOLUTION INTRAMUSCULAR; INTRAVENOUS
Status: COMPLETED | OUTPATIENT
Start: 2021-12-29 | End: 2021-12-29

## 2021-12-29 RX ORDER — FAMOTIDINE 20 MG/1
20 TABLET, FILM COATED ORAL ONCE
Status: CANCELLED | OUTPATIENT
Start: 2021-12-29 | End: 2021-12-29

## 2021-12-29 RX ORDER — ACETAMINOPHEN 160 MG/5ML
1000 SOLUTION ORAL EVERY 8 HOURS SCHEDULED
Status: DISCONTINUED | OUTPATIENT
Start: 2021-12-29 | End: 2021-12-30 | Stop reason: HOSPADM

## 2021-12-29 RX ORDER — SODIUM CHLORIDE 0.9 % (FLUSH) 0.9 %
10 SYRINGE (ML) INJECTION EVERY 12 HOURS SCHEDULED
Status: DISCONTINUED | OUTPATIENT
Start: 2021-12-29 | End: 2021-12-29 | Stop reason: HOSPADM

## 2021-12-29 RX ORDER — ONDANSETRON 2 MG/ML
4 INJECTION INTRAMUSCULAR; INTRAVENOUS EVERY 6 HOURS PRN
Status: DISCONTINUED | OUTPATIENT
Start: 2021-12-29 | End: 2021-12-30 | Stop reason: HOSPADM

## 2021-12-29 RX ORDER — DROPERIDOL 2.5 MG/ML
0.62 INJECTION, SOLUTION INTRAMUSCULAR; INTRAVENOUS ONCE AS NEEDED
Status: DISCONTINUED | OUTPATIENT
Start: 2021-12-29 | End: 2021-12-29 | Stop reason: HOSPADM

## 2021-12-29 RX ORDER — LORAZEPAM 2 MG/ML
0.5 INJECTION INTRAMUSCULAR EVERY 12 HOURS PRN
Status: DISCONTINUED | OUTPATIENT
Start: 2021-12-29 | End: 2021-12-30 | Stop reason: HOSPADM

## 2021-12-29 RX ADMIN — SODIUM CHLORIDE, POTASSIUM CHLORIDE, SODIUM LACTATE AND CALCIUM CHLORIDE: 600; 310; 30; 20 INJECTION, SOLUTION INTRAVENOUS at 13:34

## 2021-12-29 RX ADMIN — CHLORHEXIDINE GLUCONATE 0.12% ORAL RINSE 30 ML: 1.2 LIQUID ORAL at 11:15

## 2021-12-29 RX ADMIN — ACETAMINOPHEN 1000 MG: 500 TABLET ORAL at 20:16

## 2021-12-29 RX ADMIN — CITALOPRAM HYDROBROMIDE 20 MG: 20 TABLET ORAL at 20:16

## 2021-12-29 RX ADMIN — CEFAZOLIN SODIUM 2 G: 2 INJECTION, SOLUTION INTRAVENOUS at 13:39

## 2021-12-29 RX ADMIN — SODIUM CHLORIDE, POTASSIUM CHLORIDE, SODIUM LACTATE AND CALCIUM CHLORIDE 1000 ML: 600; 310; 30; 20 INJECTION, SOLUTION INTRAVENOUS at 11:00

## 2021-12-29 RX ADMIN — LABETALOL HYDROCHLORIDE 5 MG: 5 INJECTION, SOLUTION INTRAVENOUS at 16:29

## 2021-12-29 RX ADMIN — CHLORHEXIDINE GLUCONATE 0.12% ORAL RINSE 30 ML: 1.2 LIQUID ORAL at 11:35

## 2021-12-29 RX ADMIN — DEXAMETHASONE SODIUM PHOSPHATE 8 MG: 4 INJECTION, SOLUTION INTRA-ARTICULAR; INTRALESIONAL; INTRAMUSCULAR; INTRAVENOUS; SOFT TISSUE at 13:55

## 2021-12-29 RX ADMIN — SODIUM CHLORIDE, POTASSIUM CHLORIDE, SODIUM LACTATE AND CALCIUM CHLORIDE: 600; 310; 30; 20 INJECTION, SOLUTION INTRAVENOUS at 14:47

## 2021-12-29 RX ADMIN — BUPIVACAINE HYDROCHLORIDE 60 ML: 2.5 INJECTION, SOLUTION EPIDURAL; INFILTRATION; INTRACAUDAL; PERINEURAL at 11:57

## 2021-12-29 RX ADMIN — SODIUM CHLORIDE, POTASSIUM CHLORIDE, SODIUM LACTATE AND CALCIUM CHLORIDE 150 ML/HR: 600; 310; 30; 20 INJECTION, SOLUTION INTRAVENOUS at 18:07

## 2021-12-29 RX ADMIN — LABETALOL 20 MG/4 ML (5 MG/ML) INTRAVENOUS SYRINGE 5 MG: at 16:29

## 2021-12-29 RX ADMIN — ONDANSETRON 4 MG: 2 INJECTION INTRAMUSCULAR; INTRAVENOUS at 18:06

## 2021-12-29 RX ADMIN — METOPROLOL SUCCINATE 50 MG: 50 TABLET, EXTENDED RELEASE ORAL at 21:28

## 2021-12-29 RX ADMIN — DEXAMETHASONE SODIUM PHOSPHATE 4 MG: 10 INJECTION, SOLUTION INTRAMUSCULAR; INTRAVENOUS at 11:57

## 2021-12-29 RX ADMIN — LIDOCAINE HYDROCHLORIDE 0.5 ML: 10 INJECTION, SOLUTION EPIDURAL; INFILTRATION; INTRACAUDAL; PERINEURAL at 11:00

## 2021-12-29 RX ADMIN — GABAPENTIN 600 MG: 300 CAPSULE ORAL at 11:16

## 2021-12-29 RX ADMIN — METOCLOPRAMIDE 10 MG: 5 INJECTION, SOLUTION INTRAMUSCULAR; INTRAVENOUS at 22:00

## 2021-12-29 RX ADMIN — SCOPALAMINE 1 PATCH: 1 PATCH, EXTENDED RELEASE TRANSDERMAL at 11:16

## 2021-12-29 RX ADMIN — HYDROMORPHONE HYDROCHLORIDE 0.5 MG: 1 INJECTION, SOLUTION INTRAMUSCULAR; INTRAVENOUS; SUBCUTANEOUS at 15:05

## 2021-12-29 RX ADMIN — MONTELUKAST SODIUM 10 MG: 10 TABLET, COATED ORAL at 20:16

## 2021-12-29 RX ADMIN — FENTANYL CITRATE 100 MCG: 50 INJECTION, SOLUTION INTRAMUSCULAR; INTRAVENOUS at 13:34

## 2021-12-29 RX ADMIN — PROPOFOL 250 MG: 10 INJECTION, EMULSION INTRAVENOUS at 13:34

## 2021-12-29 RX ADMIN — CEFAZOLIN SODIUM 2 G: 2 INJECTION, SOLUTION INTRAVENOUS at 21:28

## 2021-12-29 RX ADMIN — PANTOPRAZOLE SODIUM 40 MG: 40 INJECTION, POWDER, FOR SOLUTION INTRAVENOUS at 11:16

## 2021-12-29 RX ADMIN — GABAPENTIN 100 MG: 100 CAPSULE ORAL at 20:16

## 2021-12-29 RX ADMIN — DROPERIDOL 0.62 MG: 2.5 INJECTION, SOLUTION INTRAMUSCULAR; INTRAVENOUS at 15:45

## 2021-12-29 RX ADMIN — PROPOFOL 50 MCG/KG/MIN: 10 INJECTION, EMULSION INTRAVENOUS at 14:04

## 2021-12-29 RX ADMIN — ONDANSETRON 4 MG: 2 INJECTION INTRAMUSCULAR; INTRAVENOUS at 13:55

## 2021-12-29 RX ADMIN — HYDRALAZINE HYDROCHLORIDE 10 MG: 20 INJECTION INTRAMUSCULAR; INTRAVENOUS at 19:30

## 2021-12-29 RX ADMIN — ROCURONIUM BROMIDE 60 MG: 10 INJECTION, SOLUTION INTRAVENOUS at 13:34

## 2021-12-29 RX ADMIN — ACETAMINOPHEN 1000 MG: 500 TABLET ORAL at 11:16

## 2021-12-29 RX ADMIN — SUGAMMADEX 200 MG: 100 INJECTION, SOLUTION INTRAVENOUS at 14:31

## 2021-12-29 NOTE — ANESTHESIA PREPROCEDURE EVALUATION
Anesthesia Evaluation     Patient summary reviewed and Nursing notes reviewed   history of anesthetic complications: PONV  NPO Solid Status: > 8 hours  NPO Liquid Status: > 8 hours           Airway   Mallampati: II  TM distance: >3 FB  Neck ROM: full  No difficulty expected  Dental      Pulmonary - normal exam   Cardiovascular - normal exam    (+) hypertension,       Neuro/Psych  (+) psychiatric history,     GI/Hepatic/Renal/Endo    (+) morbid obesity,      Musculoskeletal     Abdominal    Substance History      OB/GYN          Other                        Anesthesia Plan    ASA 3     general     intravenous induction     Anesthetic plan, all risks, benefits, and alternatives have been provided, discussed and informed consent has been obtained with: patient.    Plan discussed with CRNA.

## 2021-12-29 NOTE — ANESTHESIA PROCEDURE NOTES
Airway  Urgency: elective    Date/Time: 12/29/2021 1:37 PM  Airway not difficult    General Information and Staff    Patient location during procedure: OR  CRNA: Destiny Mac CRNA    Indications and Patient Condition  Indications for airway management: airway protection    Preoxygenated: yes  MILS not maintained throughout  Mask difficulty assessment: 1 - vent by mask    Final Airway Details  Final airway type: endotracheal airway      Successful airway: ETT  Cuffed: yes   Successful intubation technique: direct laryngoscopy  Endotracheal tube insertion site: oral  Blade: Luis Enrique  Blade size: 3  ETT size (mm): 7.0  Cormack-Lehane Classification: grade I - full view of glottis  Placement verified by: chest auscultation and capnometry   Measured from: lips  ETT/EBT  to lips (cm): 21  Number of attempts at approach: 1  Assessment: lips, teeth, and gum same as pre-op and atraumatic intubation    Additional Comments  Negative epigastric sounds, Breath sound equal bilaterally with symmetric chest rise and fall

## 2021-12-29 NOTE — ANESTHESIA PROCEDURE NOTES
Peripheral Block      Patient reassessed immediately prior to procedure    Patient location during procedure: OR  Start time: 12/29/2021 1:35 PM  Reason for block: at surgeon's request and post-op pain management  Performed by  Anesthesiologist: Ehsan Hernandez MD  Preanesthetic Checklist  Completed: patient identified, IV checked, site marked, risks and benefits discussed, surgical consent, monitors and equipment checked, pre-op evaluation and timeout performed  Prep:  Pt Position: supine  Sterile barriers:cap, gloves, sterile barriers and mask  Prep: ChloraPrep  Patient monitoring: blood pressure monitoring, continuous pulse oximetry and EKG  Procedure    Sedation: yes  Performed under: general  Guidance:ultrasound guided  Images:still images obtained, printed/placed on chart    Laterality:Bilateral  Block Type:TAP  Injection Technique:single-shot  Needle Type:short-bevel and echogenic  Needle Gauge:20 G  Resistance on Injection: none    Medications Used: bupivacaine PF (MARCAINE) 0.25 % injection, 60 mL  dexamethasone sodium phosphate injection, 4 mg      Medications  Comment:Block Injection:  LA dose divided between Right and Left block        Post Assessment  Injection Assessment: negative aspiration for heme, incremental injection and no paresthesia on injection  Patient Tolerance:comfortable throughout block  Complications:no  Additional Notes      Under Ultrasound guidance, a BBraun 4inch 360 degree needle was advanced with Normal Saline hydro dissection of tissue.  The Internal Oblique and Transversus Abdominus muscles where visualized.  At or before the aponeurosis of Internal Oblique, local anesthetic spread was visualized in the Transversus Abdominus Plane. Injection was made incrementally with aspiration every 5 mls.  There was no  intravascular injection,  injection pressure was normal, there was no neural injection, and the procedure was completed without difficulty.  Thank You.

## 2021-12-29 NOTE — ANESTHESIA POSTPROCEDURE EVALUATION
Patient: Omero Cadet    Procedure Summary     Date: 12/29/21 Room / Location:  [facility] /  [facility]    Anesthesia Start: 1329 Anesthesia Stop:     Procedures:       GASTRIC SLEEVE LAPAROSCOPIC (N/A Abdomen)      ESOPHAGOGASTRODUODENOSCOPY (N/A Esophagus) Diagnosis:       Morbid obesity with body mass index of 40.0-44.9 in adult (HCC)      (Morbid obesity with body mass index of 40.0-44.9 in adult (HCC) [E66.01, Z68.41])    Surgeons: Alvin Dueñas MD Provider: Ehsan Hernandez MD    Anesthesia Type: general ASA Status: 3          Anesthesia Type: general    Vitals  Vitals Value Taken Time   /92    Temp 97    Pulse 79 12/29/21 1452   Resp 12    SpO2 99 % 12/29/21 1452   Vitals shown include unvalidated device data.        Post Anesthesia Care and Evaluation    Patient location during evaluation: PACU  Patient participation: complete - patient participated  Level of consciousness: awake and alert  Pain management: adequate  Airway patency: patent  Anesthetic complications: No anesthetic complications  PONV Status: none  Cardiovascular status: hemodynamically stable and acceptable  Respiratory status: nonlabored ventilation, acceptable and nasal cannula  Hydration status: acceptable

## 2021-12-30 ENCOUNTER — APPOINTMENT (OUTPATIENT)
Dept: GENERAL RADIOLOGY | Facility: HOSPITAL | Age: 46
End: 2021-12-30

## 2021-12-30 VITALS
HEART RATE: 76 BPM | TEMPERATURE: 98.3 F | BODY MASS INDEX: 43.49 KG/M2 | HEIGHT: 65 IN | WEIGHT: 261 LBS | RESPIRATION RATE: 18 BRPM | SYSTOLIC BLOOD PRESSURE: 157 MMHG | DIASTOLIC BLOOD PRESSURE: 87 MMHG | OXYGEN SATURATION: 95 %

## 2021-12-30 LAB
ALBUMIN SERPL-MCNC: 4.3 G/DL (ref 3.5–5.2)
ALBUMIN/GLOB SERPL: 1.7 G/DL
ALP SERPL-CCNC: 76 U/L (ref 39–117)
ALT SERPL W P-5'-P-CCNC: 42 U/L (ref 1–33)
ANION GAP SERPL CALCULATED.3IONS-SCNC: 11 MMOL/L (ref 5–15)
AST SERPL-CCNC: 40 U/L (ref 1–32)
BASOPHILS # BLD AUTO: 0.01 10*3/MM3 (ref 0–0.2)
BASOPHILS NFR BLD AUTO: 0.1 % (ref 0–1.5)
BILIRUB SERPL-MCNC: 0.4 MG/DL (ref 0–1.2)
BUN SERPL-MCNC: 8 MG/DL (ref 6–20)
BUN/CREAT SERPL: 12.3 (ref 7–25)
CALCIUM SPEC-SCNC: 9.2 MG/DL (ref 8.6–10.5)
CHLORIDE SERPL-SCNC: 105 MMOL/L (ref 98–107)
CO2 SERPL-SCNC: 26 MMOL/L (ref 22–29)
CREAT SERPL-MCNC: 0.65 MG/DL (ref 0.57–1)
DEPRECATED RDW RBC AUTO: 43.8 FL (ref 37–54)
EOSINOPHIL # BLD AUTO: 0 10*3/MM3 (ref 0–0.4)
EOSINOPHIL NFR BLD AUTO: 0 % (ref 0.3–6.2)
ERYTHROCYTE [DISTWIDTH] IN BLOOD BY AUTOMATED COUNT: 14.4 % (ref 12.3–15.4)
GFR SERPL CREATININE-BSD FRML MDRD: 99 ML/MIN/1.73
GLOBULIN UR ELPH-MCNC: 2.5 GM/DL
GLUCOSE SERPL-MCNC: 97 MG/DL (ref 65–99)
HCT VFR BLD AUTO: 37.8 % (ref 34–46.6)
HGB BLD-MCNC: 12 G/DL (ref 12–15.9)
IMM GRANULOCYTES # BLD AUTO: 0.05 10*3/MM3 (ref 0–0.05)
IMM GRANULOCYTES NFR BLD AUTO: 0.4 % (ref 0–0.5)
IRON 24H UR-MRATE: 38 MCG/DL (ref 37–145)
LYMPHOCYTES # BLD AUTO: 1 10*3/MM3 (ref 0.7–3.1)
LYMPHOCYTES NFR BLD AUTO: 8.2 % (ref 19.6–45.3)
MCH RBC QN AUTO: 26.5 PG (ref 26.6–33)
MCHC RBC AUTO-ENTMCNC: 31.7 G/DL (ref 31.5–35.7)
MCV RBC AUTO: 83.6 FL (ref 79–97)
MONOCYTES # BLD AUTO: 0.71 10*3/MM3 (ref 0.1–0.9)
MONOCYTES NFR BLD AUTO: 5.8 % (ref 5–12)
NEUTROPHILS NFR BLD AUTO: 10.47 10*3/MM3 (ref 1.7–7)
NEUTROPHILS NFR BLD AUTO: 85.5 % (ref 42.7–76)
NRBC BLD AUTO-RTO: 0 /100 WBC (ref 0–0.2)
PLATELET # BLD AUTO: 290 10*3/MM3 (ref 140–450)
PMV BLD AUTO: 10.4 FL (ref 6–12)
POTASSIUM SERPL-SCNC: 4.1 MMOL/L (ref 3.5–5.2)
PROT SERPL-MCNC: 6.8 G/DL (ref 6–8.5)
RBC # BLD AUTO: 4.52 10*6/MM3 (ref 3.77–5.28)
SODIUM SERPL-SCNC: 142 MMOL/L (ref 136–145)
WBC NRBC COR # BLD: 12.24 10*3/MM3 (ref 3.4–10.8)

## 2021-12-30 PROCEDURE — 0 DIATRIZOATE MEGLUMINE & SODIUM PER 1 ML: Performed by: SURGERY

## 2021-12-30 PROCEDURE — 25010000002 NA FERRIC GLUC CPLX PER 12.5 MG: Performed by: SURGERY

## 2021-12-30 PROCEDURE — C9399 UNCLASSIFIED DRUGS OR BIOLOG: HCPCS | Performed by: SURGERY

## 2021-12-30 PROCEDURE — 25010000002 CYANOCOBALAMIN PER 1000 MCG: Performed by: SURGERY

## 2021-12-30 PROCEDURE — 0 POTASSIUM CHLORIDE PER 2 MEQ: Performed by: SURGERY

## 2021-12-30 PROCEDURE — 74240 X-RAY XM UPR GI TRC 1CNTRST: CPT

## 2021-12-30 PROCEDURE — 99024 POSTOP FOLLOW-UP VISIT: CPT | Performed by: SURGERY

## 2021-12-30 PROCEDURE — 83540 ASSAY OF IRON: CPT | Performed by: SURGERY

## 2021-12-30 PROCEDURE — 25010000002 AMISULPRIDE (ANTIEMETIC) 5 MG/2ML SOLUTION: Performed by: SURGERY

## 2021-12-30 PROCEDURE — 80053 COMPREHEN METABOLIC PANEL: CPT | Performed by: SURGERY

## 2021-12-30 PROCEDURE — 25010000002 ONDANSETRON PER 1 MG: Performed by: SURGERY

## 2021-12-30 PROCEDURE — 25010000002 ENOXAPARIN PER 10 MG: Performed by: SURGERY

## 2021-12-30 PROCEDURE — 25010000002 THIAMINE PER 100 MG: Performed by: SURGERY

## 2021-12-30 PROCEDURE — 0 CEFAZOLIN IN DEXTROSE 2-4 GM/100ML-% SOLUTION: Performed by: SURGERY

## 2021-12-30 PROCEDURE — 85025 COMPLETE CBC W/AUTO DIFF WBC: CPT | Performed by: SURGERY

## 2021-12-30 RX ORDER — PROMETHAZINE HYDROCHLORIDE 12.5 MG/1
12.5 TABLET ORAL EVERY 4 HOURS PRN
Qty: 10 TABLET | Refills: 0 | Status: SHIPPED | OUTPATIENT
Start: 2021-12-30 | End: 2022-01-05

## 2021-12-30 RX ORDER — OXYCODONE HYDROCHLORIDE 5 MG/1
5 TABLET ORAL EVERY 4 HOURS PRN
Qty: 10 TABLET | Refills: 0 | Status: SHIPPED | OUTPATIENT
Start: 2021-12-30 | End: 2022-02-02

## 2021-12-30 RX ORDER — OMEPRAZOLE 40 MG/1
40 CAPSULE, DELAYED RELEASE ORAL DAILY
Qty: 60 CAPSULE | Refills: 0 | Status: SHIPPED | OUTPATIENT
Start: 2021-12-30 | End: 2022-02-28

## 2021-12-30 RX ORDER — ONDANSETRON 4 MG/1
4 TABLET, FILM COATED ORAL EVERY 4 HOURS PRN
Qty: 10 TABLET | Refills: 0 | Status: SHIPPED | OUTPATIENT
Start: 2021-12-30 | End: 2022-02-02

## 2021-12-30 RX ADMIN — SODIUM CHLORIDE 125 MG: 9 INJECTION, SOLUTION INTRAVENOUS at 15:50

## 2021-12-30 RX ADMIN — AMISULPRIDE 10 MG: 2.5 INJECTION, SOLUTION INTRAVENOUS at 09:08

## 2021-12-30 RX ADMIN — ACETAMINOPHEN 1000 MG: 500 TABLET ORAL at 05:16

## 2021-12-30 RX ADMIN — POTASSIUM CHLORIDE AND SODIUM CHLORIDE 125 ML/HR: 450; 150 INJECTION, SOLUTION INTRAVENOUS at 14:30

## 2021-12-30 RX ADMIN — PANTOPRAZOLE SODIUM 40 MG: 40 INJECTION, POWDER, FOR SOLUTION INTRAVENOUS at 05:16

## 2021-12-30 RX ADMIN — CEFAZOLIN SODIUM 2 G: 2 INJECTION, SOLUTION INTRAVENOUS at 05:16

## 2021-12-30 RX ADMIN — THIAMINE HYDROCHLORIDE 100 ML/HR: 100 INJECTION, SOLUTION INTRAMUSCULAR; INTRAVENOUS at 06:36

## 2021-12-30 RX ADMIN — GABAPENTIN 100 MG: 100 CAPSULE ORAL at 10:27

## 2021-12-30 RX ADMIN — POTASSIUM CHLORIDE AND SODIUM CHLORIDE 125 ML/HR: 450; 150 INJECTION, SOLUTION INTRAVENOUS at 12:44

## 2021-12-30 RX ADMIN — OXYCODONE HYDROCHLORIDE 5 MG: 5 TABLET ORAL at 10:42

## 2021-12-30 RX ADMIN — Medication 30 ML: at 09:59

## 2021-12-30 RX ADMIN — ACETAMINOPHEN 1000 MG: 500 TABLET ORAL at 14:28

## 2021-12-30 RX ADMIN — CYANOCOBALAMIN 1000 MCG: 1000 INJECTION, SOLUTION INTRAMUSCULAR at 10:27

## 2021-12-30 RX ADMIN — CITALOPRAM HYDROBROMIDE 20 MG: 20 TABLET ORAL at 10:27

## 2021-12-30 RX ADMIN — SODIUM CHLORIDE, POTASSIUM CHLORIDE, SODIUM LACTATE AND CALCIUM CHLORIDE 150 ML/HR: 600; 310; 30; 20 INJECTION, SOLUTION INTRAVENOUS at 00:16

## 2021-12-30 RX ADMIN — ONDANSETRON 4 MG: 2 INJECTION INTRAMUSCULAR; INTRAVENOUS at 00:16

## 2021-12-30 RX ADMIN — ENOXAPARIN SODIUM 40 MG: 40 INJECTION SUBCUTANEOUS at 10:27

## 2022-01-05 ENCOUNTER — OFFICE VISIT (OUTPATIENT)
Dept: BARIATRICS/WEIGHT MGMT | Facility: CLINIC | Age: 47
End: 2022-01-05

## 2022-01-05 VITALS
HEIGHT: 65 IN | HEART RATE: 82 BPM | SYSTOLIC BLOOD PRESSURE: 120 MMHG | BODY MASS INDEX: 39.82 KG/M2 | WEIGHT: 239 LBS | DIASTOLIC BLOOD PRESSURE: 68 MMHG | OXYGEN SATURATION: 98 % | RESPIRATION RATE: 18 BRPM | TEMPERATURE: 96.7 F

## 2022-01-05 DIAGNOSIS — Z98.84 S/P BARIATRIC SURGERY: Primary | ICD-10-CM

## 2022-01-05 PROCEDURE — 99024 POSTOP FOLLOW-UP VISIT: CPT | Performed by: PHYSICIAN ASSISTANT

## 2022-01-05 NOTE — PROGRESS NOTES
"[facility] Bariatric Surgery  [address]  [address]  [facility] [id number] [phone]      Patient Name:  Omero Cadet  :  1975      Date of Visit: 2022      Reason for Visit:  POD #7    HPI:  Omero Cadet is a 46 y.o. female s/p LSG 21 GDW (selfpay)    Discharged on POD#1.  Struggled a little w/ PO protein intake initially, but says doing better now.  Feeling good overall, but notes dysosmia since having surgery - everything smells \"very bad\".  At times having to hold her nose just to get down her protein.  Says she did temporarily lose her sense of smell w/ COVID back in Feb, but had no persisting issues since - \"this is different.\"    Otherwise no issues/concerns.  Denies dysphagia, reflux, nausea, vomiting, abdominal pain, pulmonary issues and fevers.  Tolerating diet progression - on stage 1.  Getting 90g prot/day.  Drinking 64 fluid oz/day.  Wearing MVI, B12, iron, biotin patchAID vitamins.  On Omeprazole.  Ambulating frequently.     Presurgery weight: 261 pounds.  Today's weight is 108 kg (239 lb) pounds, today's  Body mass index is 39.77 kg/m²., and weight loss since surgery is 22 pounds.       Final Diagnosis   STOMACH, SUBTOTAL GASTRECTOMY:  Oxyntic-type gastric mucosa with minimal chronic inactive inflammation  Negative for Helicobacter-like organisms on routine stain  Negative for intestinal metaplasia, dysplasia or malignancy  Stomach wall within normal limits         Past Medical History:   Diagnosis Date   • Anxiety    • Elevated LDL cholesterol level    • Fatigue    • Heartburn     tums prn-  EGD remote past, EGD Dr. Dueñas 2021   • History of COVID-19     not hospitalized, now wtih wheezing and on singulair   • HTN (hypertension)    • Morbid obesity with BMI of 40.0-44.9, adult (HCC)    •  (normal spontaneous vaginal delivery)     x 2 without complics   • PONV (postoperative nausea and vomiting)    • S/P etelvina     wtih " gallstones     Past Surgical History:   Procedure Laterality Date   • ENDOSCOPY N/A 2021    Procedure: ESOPHAGOGASTRODUODENOSCOPY;  Surgeon: Alvin Dueñas MD;  Location:  [facility];  Service: Bariatric;  Laterality: N/A;   • GASTRECTOMY N/A 2021    Procedure: GASTRECTOMY LAPAROSCOPIC;  Surgeon: Alvin Dueñas MD;  Location:  [facility];  Service: Bariatric;  Laterality: N/A;   • HYSTERECTOMY      partial, laparoscopic   • LAPAROSCOPIC CHOLECYSTECTOMY      with stones     Outpatient Medications Marked as Taking for the 22 encounter (Office Visit) with Dunia Sanchez PA   Medication Sig Dispense Refill   • citalopram (CeleXA) 20 MG tablet Take 20 mg by mouth Daily.     • metoprolol succinate XL (TOPROL-XL) 50 MG 24 hr tablet Take 50 mg by mouth Daily.     • montelukast (SINGULAIR) 10 MG tablet Take 10 mg by mouth Every Night.     • omeprazole (priLOSEC) 40 MG capsule Take 1 capsule by mouth Daily for 60 days. 60 capsule 0   • oxyCODONE (Roxicodone) 5 MG immediate release tablet Take 1 tablet by mouth Every 4 (Four) Hours As Needed for Moderate Pain . 10 tablet 0     Allergies   Allergen Reactions   • Penicillins Rash     Tolerates cephalosporins       Social History     Socioeconomic History   • Marital status:    Tobacco Use   • Smoking status: Former Smoker     Packs/day: 1.00     Years: 10.00     Pack years: 10.00     Types: Cigarettes     Quit date:      Years since quittin.0   • Smokeless tobacco: Never Used   Vaping Use   • Vaping Use: Never used   Substance and Sexual Activity   • Alcohol use: Never   • Drug use: Never     Social History     Social History Narrative    Pt is a 45 yr old female,  to Anam with 2 children (14, 17 yrs) living in [place], [place] (near [place]). Pt works full time at [employer] for 21 yrs now.       /68 (BP Location: Left arm, Patient Position: Sitting, Cuff Size: Large Adult)   Pulse 82   Temp  "96.7 °F (35.9 °C) (Temporal)   Resp 18   Ht 165.1 cm (65\")   Wt 108 kg (239 lb)   SpO2 98%   BMI 39.77 kg/m²   Physical Exam  Constitutional:       Appearance: She is well-developed.      Comments: wearing a mask   Eyes:      General: No scleral icterus.  Cardiovascular:      Rate and Rhythm: Normal rate.   Pulmonary:      Effort: Pulmonary effort is normal.   Abdominal:      Palpations: Abdomen is soft.      Tenderness: There is no abdominal tenderness.      Hernia: No hernia is present.      Comments: incisions healing well   Musculoskeletal:         General: Normal range of motion.   Skin:     General: Skin is warm and dry.      Findings: No rash.   Neurological:      Mental Status: She is alert.   Psychiatric:         Behavior: Behavior is cooperative.         Judgment: Judgment normal.           Assessment:   POD #7 s/p LSG 12/29/21 GDW (selfpay)      Patient's Body mass index is 39.77 kg/m². indicating that she is obese (BMI >30). Obesity-related health conditions include the following: see above. Obesity is improving with treatment. BMI is is above average; BMI management plan is completed. We discussed see plan.      Plan:  Continue to advance diet per manual.  Continue protein 100g/day.  Increase exercise/activity as tolerated.  Reviewed lifting restrictions, nothing >25 lbs x 2 more weeks. Continue vitamins as discussed.  Continue PPI.  Continue to avoid ASA/NSAIDs/tobacco x 6 weeks postop, steroids x 8 weeks postop.  Call w/ problems/concerns.    Addendum:  Told her I really did not have an explanation or any recommendations for the smell issues.  Could be r/t anesthesia.  Could be r/t hx COVID.  Told her we may just need to give it more time. Very important to focus on protein intake/hydration regardless.  Let us know if changes/worsens.  Per Dr. Dueñas - \"I agree, nothing else to add.  Given our experience, this has nothing to do with the sleeve or anesthesia as we haven't encountered it previously. " " Hopefully it will go away.  If she seeks other opinions, remind her to avoid ASA/NSAIDs/steroids.  Thanks!\"       The patient was instructed to follow up in 3 weeks, sooner if needed.   "

## 2022-02-02 ENCOUNTER — OFFICE VISIT (OUTPATIENT)
Dept: BARIATRICS/WEIGHT MGMT | Facility: CLINIC | Age: 47
End: 2022-02-02

## 2022-02-02 VITALS
HEIGHT: 65 IN | TEMPERATURE: 97.9 F | SYSTOLIC BLOOD PRESSURE: 122 MMHG | WEIGHT: 230.5 LBS | OXYGEN SATURATION: 99 % | DIASTOLIC BLOOD PRESSURE: 76 MMHG | HEART RATE: 80 BPM | BODY MASS INDEX: 38.4 KG/M2 | RESPIRATION RATE: 18 BRPM

## 2022-02-02 DIAGNOSIS — R10.13 DYSPEPSIA: ICD-10-CM

## 2022-02-02 DIAGNOSIS — E55.9 VITAMIN D DEFICIENCY: ICD-10-CM

## 2022-02-02 DIAGNOSIS — E66.9 OBESITY, CLASS II, BMI 35-39.9: Primary | ICD-10-CM

## 2022-02-02 DIAGNOSIS — R53.83 FATIGUE, UNSPECIFIED TYPE: ICD-10-CM

## 2022-02-02 DIAGNOSIS — R79.0 ABNORMAL BLOOD LEVEL OF IRON: ICD-10-CM

## 2022-02-02 DIAGNOSIS — I10 HYPERTENSION, UNSPECIFIED TYPE: ICD-10-CM

## 2022-02-02 PROCEDURE — 99024 POSTOP FOLLOW-UP VISIT: CPT | Performed by: PHYSICIAN ASSISTANT

## 2022-02-02 NOTE — PROGRESS NOTES
[facility] Bariatric Surgery  [address]  [address]  [facility] [id number] [phone]      Patient Name:  Omero Cadet  :  1975      Date of Visit: 2022      Reason for Visit:  routine eval - 5 weeks postop      HPI:  Omero Cadet is a 46 y.o. female s/p LSG 21 GDW (selfpay)    Feeling really good.  Still having some issues w/ dysosmia, but says it has gotten a little better.  Can't tolerate coffee - tastes awful.  Says her toothpaste and anything fried still smells/tastes awful to her as well.  Able to tolerate grilled chicken and protein shakes w/out issue.  Getting 80g prot/day.  Denies dysphagia/reflux/N/V/AP.  Continues on Omeprazole.  Wearing MVI, B12, iron, biotin patchAID vitamins.   Walking some, but not yet exercising o/w.      Presurgery weight: 261 pounds.  Today's weight is 105 kg (230 lb 8 oz) pounds, today's  Body mass index is 38.36 kg/m²., and weight loss since surgery is 31 pounds.         Past Medical History:   Diagnosis Date   • Anxiety    • Elevated LDL cholesterol level    • Fatigue    • Heartburn     tums prn - EGD Dr. Dueñas 2021   • History of COVID-19     2021, not hospitalized, now w/ wheezing and on singulair   • HTN (hypertension)    • Morbid obesity with BMI of 40.0-44.9, adult (HCC)    •  (normal spontaneous vaginal delivery)     x 2 without complics   • PONV (postoperative nausea and vomiting)    • S/P etelvina     wtih gallstones     Past Surgical History:   Procedure Laterality Date   • ENDOSCOPY N/A 2021    Procedure: ESOPHAGOGASTRODUODENOSCOPY;  Surgeon: Alvin Dueñas MD;  [facility]:  [facility];  Service: Bariatric;  Laterality: N/A;   • GASTRECTOMY N/A 2021    Procedure: GASTRECTOMY LAPAROSCOPIC;  Surgeon: Alvin Dueñas MD;  Location:  [facility];  Service: Bariatric;  Laterality: N/A;   • HYSTERECTOMY      partial, laparoscopic   • LAPAROSCOPIC CHOLECYSTECTOMY      with stones  "    Outpatient Medications Marked as Taking for the 22 encounter (Office Visit) with Dunia Sanchez PA   Medication Sig Dispense Refill   • citalopram (CeleXA) 20 MG tablet Take 20 mg by mouth Daily.     • metoprolol succinate XL (TOPROL-XL) 50 MG 24 hr tablet Take 50 mg by mouth Daily.     • montelukast (SINGULAIR) 10 MG tablet Take 10 mg by mouth Every Night.     • omeprazole (priLOSEC) 40 MG capsule Take 1 capsule by mouth Daily for 60 days. 60 capsule 0     Allergies   Allergen Reactions   • Penicillins Rash     Tolerates cephalosporins       Social History     Socioeconomic History   • Marital status:    Tobacco Use   • Smoking status: Former Smoker     Packs/day: 1.00     Years: 10.00     Pack years: 10.00     Types: Cigarettes     Quit date:      Years since quittin.0   • Smokeless tobacco: Never Used   Vaping Use   • Vaping Use: Never used   Substance and Sexual Activity   • Alcohol use: Never   • Drug use: Never     Social History     Social History Narrative    Lives in [place], [place] (near [place]).   w/ 2 children.  Works @ [employer].       /76 (BP Location: Left arm, Patient Position: Sitting, Cuff Size: Large Adult)   Pulse 80   Temp 97.9 °F (36.6 °C) (Temporal)   Resp 18   Ht 165.1 cm (65\")   Wt 105 kg (230 lb 8 oz)   SpO2 99%   BMI 38.36 kg/m²   Physical Exam  Constitutional:       Appearance: She is well-developed.      Comments: wearing a mask   Eyes:      General: No scleral icterus.  Cardiovascular:      Rate and Rhythm: Normal rate.   Pulmonary:      Effort: Pulmonary effort is normal.   Abdominal:      Palpations: Abdomen is soft.      Tenderness: There is no abdominal tenderness.      Hernia: No hernia is present.      Comments: incisions healing well   Musculoskeletal:         General: Normal range of motion.   Skin:     General: Skin is warm and dry.      Findings: No rash.   Neurological:      Mental Status: She is alert. "   Psychiatric:         Behavior: Behavior is cooperative.         Judgment: Judgment normal.           Assessment:   5 weeks s/p LSG 12/29/21 GDW (selfpay)      Patient's Body mass index is 38.36 kg/m². indicating that she is obese (BMI >30). Obesity-related health conditions include the following: see above. Obesity is improving with treatment. BMI is is above average; BMI management plan is completed. We discussed see plan.      Plan:  Encourages protein 100g/day.  Increase exercise/activity as able.  Routine bariatric labs ordered.  Continue vitamins w/ adjustments pending lab results.  Continue PPI.  Continue to avoid ASA/NSAIDs/tobacco x 6 weeks postop, steroids x 8 weeks postop.  Call w/ problems/concerns.      The patient was instructed to follow up in 2 months, sooner if needed.    Attending Only

## 2022-02-13 LAB
25(OH)D3+25(OH)D2 SERPL-MCNC: 28.3 NG/ML (ref 30–100)
ALBUMIN SERPL-MCNC: 4.3 G/DL (ref 3.8–4.8)
ALBUMIN/GLOB SERPL: 1.7 {RATIO} (ref 1.2–2.2)
ALP SERPL-CCNC: 83 IU/L (ref 44–121)
ALT SERPL-CCNC: 54 IU/L (ref 0–32)
AST SERPL-CCNC: 37 IU/L (ref 0–40)
BASOPHILS # BLD AUTO: 0.1 X10E3/UL (ref 0–0.2)
BASOPHILS NFR BLD AUTO: 1 %
BILIRUB SERPL-MCNC: 0.5 MG/DL (ref 0–1.2)
BUN SERPL-MCNC: 16 MG/DL (ref 6–24)
BUN/CREAT SERPL: 21 (ref 9–23)
CALCIUM SERPL-MCNC: 9 MG/DL (ref 8.7–10.2)
CHLORIDE SERPL-SCNC: 104 MMOL/L (ref 96–106)
CO2 SERPL-SCNC: 21 MMOL/L (ref 20–29)
CREAT SERPL-MCNC: 0.76 MG/DL (ref 0.57–1)
EOSINOPHIL # BLD AUTO: 0.2 X10E3/UL (ref 0–0.4)
EOSINOPHIL NFR BLD AUTO: 2 %
ERYTHROCYTE [DISTWIDTH] IN BLOOD BY AUTOMATED COUNT: 14.9 % (ref 11.7–15.4)
FERRITIN SERPL-MCNC: 187 NG/ML (ref 15–150)
FOLATE SERPL-MCNC: 3.9 NG/ML
GLOBULIN SER CALC-MCNC: 2.5 G/DL (ref 1.5–4.5)
GLUCOSE SERPL-MCNC: 82 MG/DL (ref 65–99)
HCT VFR BLD AUTO: 37.3 % (ref 34–46.6)
HGB BLD-MCNC: 12.3 G/DL (ref 11.1–15.9)
IMM GRANULOCYTES # BLD AUTO: 0 X10E3/UL (ref 0–0.1)
IMM GRANULOCYTES NFR BLD AUTO: 0 %
IRON SERPL-MCNC: 29 UG/DL (ref 27–159)
LYMPHOCYTES # BLD AUTO: 1 X10E3/UL (ref 0.7–3.1)
LYMPHOCYTES NFR BLD AUTO: 13 %
MCH RBC QN AUTO: 26.9 PG (ref 26.6–33)
MCHC RBC AUTO-ENTMCNC: 33 G/DL (ref 31.5–35.7)
MCV RBC AUTO: 82 FL (ref 79–97)
METHYLMALONATE SERPL-SCNC: 187 NMOL/L (ref 0–378)
MONOCYTES # BLD AUTO: 0.5 X10E3/UL (ref 0.1–0.9)
MONOCYTES NFR BLD AUTO: 6 %
NEUTROPHILS # BLD AUTO: 6.2 X10E3/UL (ref 1.4–7)
NEUTROPHILS NFR BLD AUTO: 78 %
PLATELET # BLD AUTO: 266 X10E3/UL (ref 150–450)
POTASSIUM SERPL-SCNC: 4.3 MMOL/L (ref 3.5–5.2)
PREALB SERPL-MCNC: 16 MG/DL (ref 12–34)
PROT SERPL-MCNC: 6.8 G/DL (ref 6–8.5)
RBC # BLD AUTO: 4.57 X10E6/UL (ref 3.77–5.28)
SODIUM SERPL-SCNC: 140 MMOL/L (ref 134–144)
VIT B1 BLD-SCNC: 90.8 NMOL/L (ref 66.5–200)
WBC # BLD AUTO: 8 X10E3/UL (ref 3.4–10.8)

## 2022-02-17 RX ORDER — ERGOCALCIFEROL 1.25 MG/1
50000 CAPSULE ORAL
Qty: 12 CAPSULE | Refills: 0 | Status: SHIPPED | OUTPATIENT
Start: 2022-02-17 | End: 2022-05-06

## 2022-04-18 ENCOUNTER — TRANSCRIBE ORDERS (OUTPATIENT)
Dept: ADMINISTRATIVE | Facility: HOSPITAL | Age: 47
End: 2022-04-18

## 2022-04-18 DIAGNOSIS — Z12.31 SCREENING MAMMOGRAM FOR BREAST CANCER: Primary | ICD-10-CM

## 2022-06-06 ENCOUNTER — OFFICE VISIT (OUTPATIENT)
Dept: BARIATRICS/WEIGHT MGMT | Facility: CLINIC | Age: 47
End: 2022-06-06

## 2022-06-06 VITALS
HEIGHT: 65 IN | WEIGHT: 203 LBS | TEMPERATURE: 97.7 F | HEART RATE: 73 BPM | SYSTOLIC BLOOD PRESSURE: 112 MMHG | DIASTOLIC BLOOD PRESSURE: 68 MMHG | RESPIRATION RATE: 16 BRPM | BODY MASS INDEX: 33.82 KG/M2 | OXYGEN SATURATION: 98 %

## 2022-06-06 DIAGNOSIS — R79.0 ABNORMAL BLOOD LEVEL OF IRON: ICD-10-CM

## 2022-06-06 DIAGNOSIS — E66.9 OBESITY, CLASS I, BMI 30-34.9: Primary | ICD-10-CM

## 2022-06-06 DIAGNOSIS — R53.83 FATIGUE, UNSPECIFIED TYPE: ICD-10-CM

## 2022-06-06 DIAGNOSIS — I10 HYPERTENSION, UNSPECIFIED TYPE: ICD-10-CM

## 2022-06-06 DIAGNOSIS — E55.9 VITAMIN D DEFICIENCY: ICD-10-CM

## 2022-06-06 PROCEDURE — 99024 POSTOP FOLLOW-UP VISIT: CPT | Performed by: PHYSICIAN ASSISTANT

## 2022-06-06 NOTE — PROGRESS NOTES
[facility] Bariatric Surgery  [address]  [address]  [facility] [id number] [phone]      Patient Name:  Omero Cadet  :  1975      Date of Visit: 2022      Reason for Visit:  routine eval - 5 months postop      HPI:  Omero Cadet is a 46 y.o. female s/p LSG 21 GDW (selfpay)    Very satisfied w/ her progress thus far.  Says weight loss has stalled more recently, but also says has been dealing w/ a lot of stress (father has been ill).  Continues to focus on protein - getting 90g/day.  Not tracking calories but consistently makes healthy, low calorie food choices.  Fortunately dysosmia has improved.  Denies dysphagia/reflux/N/V/AP.  No longer taking/needing PPI.  Off BP medication.  Not currently exercising.     Labs 22 - low Vit D (28.3), prealbumin 16, o/w okay.  Taking biotin + [place] complete.  Took several doses of wkly Vit D, but did not finish RX.       Presurgery weight: 261 pounds.  Today's weight is 92.1 kg (203 lb) pounds, today's  Body mass index is 33.78 kg/m²., and weight loss since surgery is 58 pounds.         Past Medical History:   Diagnosis Date   • Anxiety    • Elevated LDL cholesterol level    • Fatigue    • Heartburn     tums prn - EGD Dr. Dueñas 2021   • History of COVID-19     2021, not hospitalized, now w/ wheezing and on singulair   • HTN (hypertension)    • Morbid obesity with BMI of 40.0-44.9, adult (HCC)    •  (normal spontaneous vaginal delivery)     x 2 without complics   • PONV (postoperative nausea and vomiting)    • S/P etelvina     wtih gallstones     Past Surgical History:   Procedure Laterality Date   • ENDOSCOPY N/A 2021    Procedure: ESOPHAGOGASTRODUODENOSCOPY;  Surgeon: Alvin Dueñas MD;  [facility]:  [facility];  Service: Bariatric;  Laterality: N/A;   • GASTRECTOMY N/A 2021    Procedure: GASTRECTOMY LAPAROSCOPIC;  Surgeon: Alvin Dueñas MD;  [facility]:  [facility];  Service:  "Bariatric;  Laterality: N/A;   • HYSTERECTOMY  2015    partial, laparoscopic   • LAPAROSCOPIC CHOLECYSTECTOMY      with stones     Outpatient Medications Marked as Taking for the 22 encounter (Office Visit) with Dunia Sanchez PA   Medication Sig Dispense Refill   • citalopram (CeleXA) 20 MG tablet Take 20 mg by mouth Daily.       Allergies   Allergen Reactions   • Penicillins Rash     Tolerates cephalosporins       Social History     Socioeconomic History   • Marital status:    Tobacco Use   • Smoking status: Former Smoker     Packs/day: 1.00     Years: 10.00     Pack years: 10.00     Types: Cigarettes     Quit date:      Years since quittin.4   • Smokeless tobacco: Never Used   Vaping Use   • Vaping Use: Never used   Substance and Sexual Activity   • Alcohol use: Never   • Drug use: Never     Social History     Social History Narrative    Lives in [place], [place] (near [place]).   w/ 2 children.  Works @ [employer].       /68 (BP Location: Left arm, Patient Position: Sitting, Cuff Size: Adult)   Pulse 73   Temp 97.7 °F (36.5 °C) (Infrared)   Resp 16   Ht 165.1 cm (65\")   Wt 92.1 kg (203 lb)   SpO2 98%   BMI 33.78 kg/m²   Physical Exam  Constitutional:       Appearance: She is well-developed.      Comments: wearing a mask   Eyes:      General: No scleral icterus.  Cardiovascular:      Rate and Rhythm: Normal rate.   Pulmonary:      Effort: Pulmonary effort is normal.   Abdominal:      Palpations: Abdomen is soft.      Tenderness: There is no abdominal tenderness.      Hernia: No hernia is present.   Musculoskeletal:         General: Normal range of motion.   Skin:     General: Skin is warm and dry.      Findings: No rash.   Neurological:      Mental Status: She is alert.   Psychiatric:         Behavior: Behavior is cooperative.         Judgment: Judgment normal.           Assessment:   5 months s/p LSG 21 GDW (selfpay)      ICD-10-CM ICD-9-CM   1. " Obesity, Class I, BMI 30-34.9  E66.9 278.00   2. Fatigue, unspecified type  R53.83 780.79   3. Abnormal blood level of iron  R79.0 790.6   4. Vitamin D deficiency  E55.9 268.9   5. Hypertension, unspecified type  I10 401.9           Patient's Body mass index is 33.78 kg/m². indicating that she is obese (BMI >30). Obesity-related health conditions include the following: see above. Obesity is improving with treatment. BMI is is above average; BMI management plan is completed. We discussed see plan.      Plan:  Continue protein 100g/day.  Start tracking intake to ensure getting 1200 renata/day.  Increase exercise/activity as able.  Routine bariatric labs ordered.  Continue vitamins w/ adjustments pending lab results.  Call w/ problems/concerns.      The patient was instructed to follow up in 3 months, sooner if needed.

## 2022-06-16 ENCOUNTER — HOSPITAL ENCOUNTER (OUTPATIENT)
Dept: MAMMOGRAPHY | Facility: HOSPITAL | Age: 47
Discharge: HOME | End: 2022-06-16
Admitting: PHYSICIAN ASSISTANT

## 2022-06-16 DIAGNOSIS — Z12.31 SCREENING MAMMOGRAM FOR BREAST CANCER: ICD-10-CM

## 2022-06-16 PROCEDURE — 77063 BREAST TOMOSYNTHESIS BI: CPT

## 2022-06-16 PROCEDURE — 77067 SCR MAMMO BI INCL CAD: CPT

## 2022-06-21 LAB
25(OH)D3+25(OH)D2 SERPL-MCNC: 45.9 NG/ML (ref 30–100)
ALBUMIN SERPL-MCNC: 4.3 G/DL (ref 3.8–4.8)
ALBUMIN/GLOB SERPL: 1.8 {RATIO} (ref 1.2–2.2)
ALP SERPL-CCNC: 86 IU/L (ref 44–121)
ALT SERPL-CCNC: 19 IU/L (ref 0–32)
AST SERPL-CCNC: 17 IU/L (ref 0–40)
BASOPHILS # BLD AUTO: 0.1 X10E3/UL (ref 0–0.2)
BASOPHILS NFR BLD AUTO: 1 %
BILIRUB SERPL-MCNC: 0.4 MG/DL (ref 0–1.2)
BUN SERPL-MCNC: 17 MG/DL (ref 6–24)
BUN/CREAT SERPL: 26 (ref 9–23)
CALCIUM SERPL-MCNC: 9.6 MG/DL (ref 8.7–10.2)
CHLORIDE SERPL-SCNC: 103 MMOL/L (ref 96–106)
CO2 SERPL-SCNC: 25 MMOL/L (ref 20–29)
CREAT SERPL-MCNC: 0.65 MG/DL (ref 0.57–1)
EGFRCR SERPLBLD CKD-EPI 2021: 111 ML/MIN/1.73
EOSINOPHIL # BLD AUTO: 0.1 X10E3/UL (ref 0–0.4)
EOSINOPHIL NFR BLD AUTO: 1 %
ERYTHROCYTE [DISTWIDTH] IN BLOOD BY AUTOMATED COUNT: 13.4 % (ref 11.7–15.4)
FERRITIN SERPL-MCNC: 137 NG/ML (ref 15–150)
FOLATE SERPL-MCNC: 3.4 NG/ML
GLOBULIN SER CALC-MCNC: 2.4 G/DL (ref 1.5–4.5)
GLUCOSE SERPL-MCNC: 80 MG/DL (ref 65–99)
HCT VFR BLD AUTO: 39.6 % (ref 34–46.6)
HGB BLD-MCNC: 12.7 G/DL (ref 11.1–15.9)
IMM GRANULOCYTES # BLD AUTO: 0 X10E3/UL (ref 0–0.1)
IMM GRANULOCYTES NFR BLD AUTO: 0 %
IRON SERPL-MCNC: 34 UG/DL (ref 27–159)
LYMPHOCYTES # BLD AUTO: 1.4 X10E3/UL (ref 0.7–3.1)
LYMPHOCYTES NFR BLD AUTO: 13 %
MCH RBC QN AUTO: 27 PG (ref 26.6–33)
MCHC RBC AUTO-ENTMCNC: 32.1 G/DL (ref 31.5–35.7)
MCV RBC AUTO: 84 FL (ref 79–97)
METHYLMALONATE SERPL-SCNC: 330 NMOL/L (ref 0–378)
MONOCYTES # BLD AUTO: 0.6 X10E3/UL (ref 0.1–0.9)
MONOCYTES NFR BLD AUTO: 6 %
NEUTROPHILS # BLD AUTO: 8.2 X10E3/UL (ref 1.4–7)
NEUTROPHILS NFR BLD AUTO: 79 %
PLATELET # BLD AUTO: 276 X10E3/UL (ref 150–450)
POTASSIUM SERPL-SCNC: 4.7 MMOL/L (ref 3.5–5.2)
PREALB SERPL-MCNC: 18 MG/DL (ref 12–34)
PROT SERPL-MCNC: 6.7 G/DL (ref 6–8.5)
RBC # BLD AUTO: 4.71 X10E6/UL (ref 3.77–5.28)
SODIUM SERPL-SCNC: 141 MMOL/L (ref 134–144)
VIT B1 BLD-SCNC: 122 NMOL/L (ref 66.5–200)
WBC # BLD AUTO: 10.4 X10E3/UL (ref 3.4–10.8)

## 2023-05-16 ENCOUNTER — TRANSCRIBE ORDERS (OUTPATIENT)
Dept: ADMINISTRATIVE | Facility: HOSPITAL | Age: 48
End: 2023-05-16
Payer: COMMERCIAL

## 2023-05-16 DIAGNOSIS — Z12.31 ENCOUNTER FOR SCREENING MAMMOGRAM FOR BREAST CANCER: Primary | ICD-10-CM

## 2023-11-24 ENCOUNTER — HOSPITAL ENCOUNTER (OUTPATIENT)
Dept: MAMMOGRAPHY | Facility: HOSPITAL | Age: 48
Discharge: HOME | End: 2023-11-24
Admitting: PHYSICIAN ASSISTANT
Payer: COMMERCIAL

## 2023-11-24 DIAGNOSIS — Z12.31 ENCOUNTER FOR SCREENING MAMMOGRAM FOR BREAST CANCER: ICD-10-CM

## 2023-11-24 PROCEDURE — 77067 SCR MAMMO BI INCL CAD: CPT

## 2023-11-24 PROCEDURE — 77063 BREAST TOMOSYNTHESIS BI: CPT

## 2024-12-06 ENCOUNTER — TRANSCRIBE ORDERS (OUTPATIENT)
Dept: ADMINISTRATIVE | Facility: HOSPITAL | Age: 49
End: 2024-12-06
Payer: COMMERCIAL

## 2024-12-06 DIAGNOSIS — Z12.31 VISIT FOR SCREENING MAMMOGRAM: Primary | ICD-10-CM

## 2025-03-14 ENCOUNTER — HOSPITAL ENCOUNTER (OUTPATIENT)
Dept: MAMMOGRAPHY | Facility: HOSPITAL | Age: 50
Discharge: HOME | End: 2025-03-14
Admitting: NURSE PRACTITIONER
Payer: COMMERCIAL

## 2025-03-14 DIAGNOSIS — Z12.31 VISIT FOR SCREENING MAMMOGRAM: ICD-10-CM

## 2025-03-14 PROCEDURE — 77067 SCR MAMMO BI INCL CAD: CPT

## 2025-03-14 PROCEDURE — 77063 BREAST TOMOSYNTHESIS BI: CPT

## (undated) DEVICE — GLV SURG SENSICARE PI MIC PF SZ8.5 LF STRL

## (undated) DEVICE — [HIGH FLOW INSUFFLATOR,  DO NOT USE IF PACKAGE IS DAMAGED,  KEEP DRY,  KEEP AWAY FROM SUNLIGHT,  PROTECT FROM HEAT AND RADIOACTIVE SOURCES.]: Brand: PNEUMOSURE

## (undated) DEVICE — SHT AIR TRANSFR COMFRT GLIDE LAT 40X80IN

## (undated) DEVICE — Device: Brand: DEFENDO AIR/WATER/SUCTION AND BIOPSY VALVE

## (undated) DEVICE — PK BARIATRIC 10

## (undated) DEVICE — ENDOPATH XCEL BLADELESS TROCARS WITH STABILITY SLEEVES: Brand: ENDOPATH XCEL

## (undated) DEVICE — ENDOPATH 5MM ENDOSCOPIC BLUNT TIP DISSECTORS (12 POUCHES CONTAINING 3 DISSECTORS EACH): Brand: ENDOPATH

## (undated) DEVICE — SOL IRR H2O BTL 1000ML STRL

## (undated) DEVICE — APPL CHLORAPREP TINTED 26ML TEAL

## (undated) DEVICE — UNDRPD COMFRT GLD DRYPAD 36X57IN

## (undated) DEVICE — CONTN GRAD MEAS TRIANG 32OZ BLK

## (undated) DEVICE — TROCAR: Brand: KII FIOS FIRST ENTRY

## (undated) DEVICE — PATIENT RETURN ELECTRODE, SINGLE-USE, CONTACT QUALITY MONITORING, ADULT, WITH 9FT CORD, FOR PATIENTS WEIGING OVER 33LBS. (15KG): Brand: MEGADYNE

## (undated) DEVICE — LAPAROSCOPIC SMOKE FILTRATION SYSTEM: Brand: PALL LAPAROSHIELD® PLUS LAPAROSCOPIC SMOKE FILTRATION SYSTEM

## (undated) DEVICE — TROC STANDARDTROCAR FOR/TITANSGS 19MM DISP STRL

## (undated) DEVICE — GOWN,NON-REINFORCED,SIRUS,SET IN SLV,XXL: Brand: MEDLINE

## (undated) DEVICE — ENSEAL X1 TISSUE SEALER, CURVED JAW, 45 CM SHAFT LENGTH: Brand: ENSEAL

## (undated) DEVICE — INTENDED USE FOR SURGICAL MARKING ON INTACT SKIN, ALSO PROVIDES A PERMANENT METHOD OF IDENTIFYING OBJECTS IN THE OPERATING ROOM: Brand: WRITESITE® REGULAR TIP SKIN MARKER

## (undated) DEVICE — BLANKT WARM UPPR/BDY ARM/OUT 57X196CM

## (undated) DEVICE — Device